# Patient Record
Sex: MALE | Race: OTHER | NOT HISPANIC OR LATINO | Employment: OTHER | URBAN - METROPOLITAN AREA
[De-identification: names, ages, dates, MRNs, and addresses within clinical notes are randomized per-mention and may not be internally consistent; named-entity substitution may affect disease eponyms.]

---

## 2023-11-17 ENCOUNTER — HOSPITAL ENCOUNTER (INPATIENT)
Facility: HOSPITAL | Age: 53
LOS: 2 days | Discharge: HOME | DRG: 228 | End: 2023-11-19
Attending: EMERGENCY MEDICINE | Admitting: SURGERY
Payer: COMMERCIAL

## 2023-11-17 ENCOUNTER — ANESTHESIA (INPATIENT)
Dept: PERIOP | Facility: HOSPITAL | Age: 53
DRG: 228 | End: 2023-11-17
Payer: COMMERCIAL

## 2023-11-17 ENCOUNTER — ANESTHESIA EVENT (INPATIENT)
Dept: PERIOP | Facility: HOSPITAL | Age: 53
DRG: 228 | End: 2023-11-17
Payer: COMMERCIAL

## 2023-11-17 ENCOUNTER — APPOINTMENT (EMERGENCY)
Dept: CT IMAGING | Facility: HOSPITAL | Age: 53
DRG: 228 | End: 2023-11-17
Payer: COMMERCIAL

## 2023-11-17 DIAGNOSIS — R11.2 NAUSEA & VOMITING: ICD-10-CM

## 2023-11-17 DIAGNOSIS — K40.30 INCARCERATED RIGHT INGUINAL HERNIA: ICD-10-CM

## 2023-11-17 DIAGNOSIS — R10.31 RIGHT LOWER QUADRANT ABDOMINAL PAIN: ICD-10-CM

## 2023-11-17 DIAGNOSIS — I10 PRIMARY HYPERTENSION: ICD-10-CM

## 2023-11-17 DIAGNOSIS — I10 HYPERTENSION, UNSPECIFIED TYPE: ICD-10-CM

## 2023-11-17 DIAGNOSIS — K40.90 INGUINAL HERNIA: Primary | ICD-10-CM

## 2023-11-17 PROBLEM — F17.200 SMOKING: Status: ACTIVE | Noted: 2023-11-17

## 2023-11-17 LAB
ALBUMIN SERPL BCP-MCNC: 4.8 G/DL (ref 3.5–5)
ALP SERPL-CCNC: 56 U/L (ref 34–104)
ALT SERPL W P-5'-P-CCNC: 16 U/L (ref 7–52)
ANION GAP SERPL CALCULATED.3IONS-SCNC: 8 MMOL/L
AST SERPL W P-5'-P-CCNC: 25 U/L (ref 13–39)
BASOPHILS # BLD AUTO: 0.05 THOUSANDS/ÂΜL (ref 0–0.1)
BASOPHILS NFR BLD AUTO: 0 % (ref 0–1)
BILIRUB SERPL-MCNC: 0.79 MG/DL (ref 0.2–1)
BUN SERPL-MCNC: 11 MG/DL (ref 5–25)
CALCIUM SERPL-MCNC: 9.7 MG/DL (ref 8.4–10.2)
CHLORIDE SERPL-SCNC: 103 MMOL/L (ref 96–108)
CO2 SERPL-SCNC: 28 MMOL/L (ref 21–32)
CREAT SERPL-MCNC: 1.1 MG/DL (ref 0.6–1.3)
EOSINOPHIL # BLD AUTO: 0.16 THOUSAND/ÂΜL (ref 0–0.61)
EOSINOPHIL NFR BLD AUTO: 1 % (ref 0–6)
ERYTHROCYTE [DISTWIDTH] IN BLOOD BY AUTOMATED COUNT: 12.5 % (ref 11.6–15.1)
GFR SERPL CREATININE-BSD FRML MDRD: 76 ML/MIN/1.73SQ M
GLUCOSE SERPL-MCNC: 86 MG/DL (ref 65–140)
HCT VFR BLD AUTO: 44.8 % (ref 36.5–49.3)
HGB BLD-MCNC: 15.5 G/DL (ref 12–17)
IMM GRANULOCYTES # BLD AUTO: 0.05 THOUSAND/UL (ref 0–0.2)
IMM GRANULOCYTES NFR BLD AUTO: 0 % (ref 0–2)
LACTATE SERPL-SCNC: 1.2 MMOL/L (ref 0.5–2)
LIPASE SERPL-CCNC: 13 U/L (ref 11–82)
LYMPHOCYTES # BLD AUTO: 2.06 THOUSANDS/ÂΜL (ref 0.6–4.47)
LYMPHOCYTES NFR BLD AUTO: 18 % (ref 14–44)
MCH RBC QN AUTO: 31.7 PG (ref 26.8–34.3)
MCHC RBC AUTO-ENTMCNC: 34.6 G/DL (ref 31.4–37.4)
MCV RBC AUTO: 92 FL (ref 82–98)
MONOCYTES # BLD AUTO: 0.64 THOUSAND/ÂΜL (ref 0.17–1.22)
MONOCYTES NFR BLD AUTO: 5 % (ref 4–12)
NEUTROPHILS # BLD AUTO: 8.79 THOUSANDS/ÂΜL (ref 1.85–7.62)
NEUTS SEG NFR BLD AUTO: 76 % (ref 43–75)
NRBC BLD AUTO-RTO: 0 /100 WBCS
PLATELET # BLD AUTO: 290 THOUSANDS/UL (ref 149–390)
PLATELET # BLD AUTO: 317 THOUSANDS/UL (ref 149–390)
PMV BLD AUTO: 9 FL (ref 8.9–12.7)
PMV BLD AUTO: 9.3 FL (ref 8.9–12.7)
POTASSIUM SERPL-SCNC: 3.7 MMOL/L (ref 3.5–5.3)
PROT SERPL-MCNC: 8.1 G/DL (ref 6.4–8.4)
RBC # BLD AUTO: 4.89 MILLION/UL (ref 3.88–5.62)
SODIUM SERPL-SCNC: 139 MMOL/L (ref 135–147)
WBC # BLD AUTO: 11.75 THOUSAND/UL (ref 4.31–10.16)

## 2023-11-17 PROCEDURE — 99284 EMERGENCY DEPT VISIT MOD MDM: CPT

## 2023-11-17 PROCEDURE — 83605 ASSAY OF LACTIC ACID: CPT | Performed by: EMERGENCY MEDICINE

## 2023-11-17 PROCEDURE — 49651 LAP ING HERNIA REPAIR RECUR: CPT | Performed by: PHYSICIAN ASSISTANT

## 2023-11-17 PROCEDURE — 99245 OFF/OP CONSLTJ NEW/EST HI 55: CPT | Performed by: SURGERY

## 2023-11-17 PROCEDURE — 96376 TX/PRO/DX INJ SAME DRUG ADON: CPT

## 2023-11-17 PROCEDURE — 96374 THER/PROPH/DIAG INJ IV PUSH: CPT

## 2023-11-17 PROCEDURE — 85049 AUTOMATED PLATELET COUNT: CPT | Performed by: SURGERY

## 2023-11-17 PROCEDURE — 99285 EMERGENCY DEPT VISIT HI MDM: CPT | Performed by: EMERGENCY MEDICINE

## 2023-11-17 PROCEDURE — 96375 TX/PRO/DX INJ NEW DRUG ADDON: CPT

## 2023-11-17 PROCEDURE — 83690 ASSAY OF LIPASE: CPT | Performed by: EMERGENCY MEDICINE

## 2023-11-17 PROCEDURE — C1781 MESH (IMPLANTABLE): HCPCS | Performed by: SURGERY

## 2023-11-17 PROCEDURE — 96361 HYDRATE IV INFUSION ADD-ON: CPT

## 2023-11-17 PROCEDURE — 74177 CT ABD & PELVIS W/CONTRAST: CPT

## 2023-11-17 PROCEDURE — G1004 CDSM NDSC: HCPCS

## 2023-11-17 PROCEDURE — 36415 COLL VENOUS BLD VENIPUNCTURE: CPT | Performed by: EMERGENCY MEDICINE

## 2023-11-17 PROCEDURE — 0YU54JZ SUPPLEMENT RIGHT INGUINAL REGION WITH SYNTHETIC SUBSTITUTE, PERCUTANEOUS ENDOSCOPIC APPROACH: ICD-10-PCS | Performed by: SURGERY

## 2023-11-17 PROCEDURE — 85025 COMPLETE CBC W/AUTO DIFF WBC: CPT | Performed by: EMERGENCY MEDICINE

## 2023-11-17 PROCEDURE — 80053 COMPREHEN METABOLIC PANEL: CPT | Performed by: EMERGENCY MEDICINE

## 2023-11-17 PROCEDURE — 49651 LAP ING HERNIA REPAIR RECUR: CPT | Performed by: SURGERY

## 2023-11-17 DEVICE — LAPAROSCOPIC SELF-FIXATING MESH POLYESTER WITH POLYLACTIC ACID GRIPS AND COLLAGEN FILM
Type: IMPLANTABLE DEVICE | Site: INGUINAL | Status: FUNCTIONAL
Brand: PROGRIP

## 2023-11-17 RX ORDER — PANTOPRAZOLE SODIUM 40 MG/10ML
40 INJECTION, POWDER, LYOPHILIZED, FOR SOLUTION INTRAVENOUS
Status: DISCONTINUED | OUTPATIENT
Start: 2023-11-18 | End: 2023-11-19 | Stop reason: HOSPADM

## 2023-11-17 RX ORDER — LIDOCAINE HYDROCHLORIDE 20 MG/ML
1 JELLY TOPICAL ONCE
Status: COMPLETED | OUTPATIENT
Start: 2023-11-17 | End: 2023-11-17

## 2023-11-17 RX ORDER — CEFAZOLIN SODIUM 2 G/50ML
2000 SOLUTION INTRAVENOUS
Status: COMPLETED | OUTPATIENT
Start: 2023-11-18 | End: 2023-11-17

## 2023-11-17 RX ORDER — ONDANSETRON 2 MG/ML
4 INJECTION INTRAMUSCULAR; INTRAVENOUS EVERY 6 HOURS PRN
Status: DISCONTINUED | OUTPATIENT
Start: 2023-11-17 | End: 2023-11-19 | Stop reason: HOSPADM

## 2023-11-17 RX ORDER — HYDROMORPHONE HCL/PF 1 MG/ML
0.5 SYRINGE (ML) INJECTION
Status: DISCONTINUED | OUTPATIENT
Start: 2023-11-17 | End: 2023-11-17 | Stop reason: HOSPADM

## 2023-11-17 RX ORDER — KETOROLAC TROMETHAMINE 30 MG/ML
15 INJECTION, SOLUTION INTRAMUSCULAR; INTRAVENOUS ONCE
Status: COMPLETED | OUTPATIENT
Start: 2023-11-17 | End: 2023-11-17

## 2023-11-17 RX ORDER — MAGNESIUM HYDROXIDE 1200 MG/15ML
LIQUID ORAL AS NEEDED
Status: DISCONTINUED | OUTPATIENT
Start: 2023-11-17 | End: 2023-11-17 | Stop reason: HOSPADM

## 2023-11-17 RX ORDER — FENTANYL CITRATE 50 UG/ML
INJECTION, SOLUTION INTRAMUSCULAR; INTRAVENOUS AS NEEDED
Status: DISCONTINUED | OUTPATIENT
Start: 2023-11-17 | End: 2023-11-17

## 2023-11-17 RX ORDER — LIDOCAINE HYDROCHLORIDE 10 MG/ML
INJECTION, SOLUTION EPIDURAL; INFILTRATION; INTRACAUDAL; PERINEURAL AS NEEDED
Status: DISCONTINUED | OUTPATIENT
Start: 2023-11-17 | End: 2023-11-17

## 2023-11-17 RX ORDER — FENTANYL CITRATE/PF 50 MCG/ML
50 SYRINGE (ML) INJECTION
Status: DISCONTINUED | OUTPATIENT
Start: 2023-11-17 | End: 2023-11-17 | Stop reason: HOSPADM

## 2023-11-17 RX ORDER — SUCCINYLCHOLINE/SOD CL,ISO/PF 100 MG/5ML
SYRINGE (ML) INTRAVENOUS AS NEEDED
Status: DISCONTINUED | OUTPATIENT
Start: 2023-11-17 | End: 2023-11-17

## 2023-11-17 RX ORDER — MORPHINE SULFATE 10 MG/ML
6 INJECTION, SOLUTION INTRAMUSCULAR; INTRAVENOUS ONCE
Status: COMPLETED | OUTPATIENT
Start: 2023-11-17 | End: 2023-11-17

## 2023-11-17 RX ORDER — ONDANSETRON 2 MG/ML
4 INJECTION INTRAMUSCULAR; INTRAVENOUS ONCE
Status: COMPLETED | OUTPATIENT
Start: 2023-11-17 | End: 2023-11-17

## 2023-11-17 RX ORDER — SODIUM CHLORIDE, SODIUM LACTATE, POTASSIUM CHLORIDE, CALCIUM CHLORIDE 600; 310; 30; 20 MG/100ML; MG/100ML; MG/100ML; MG/100ML
125 INJECTION, SOLUTION INTRAVENOUS CONTINUOUS
Status: DISCONTINUED | OUTPATIENT
Start: 2023-11-17 | End: 2023-11-19 | Stop reason: HOSPADM

## 2023-11-17 RX ORDER — HYDRALAZINE HYDROCHLORIDE 20 MG/ML
5 INJECTION INTRAMUSCULAR; INTRAVENOUS
Status: COMPLETED | OUTPATIENT
Start: 2023-11-17 | End: 2023-11-17

## 2023-11-17 RX ORDER — PROPOFOL 10 MG/ML
INJECTION, EMULSION INTRAVENOUS AS NEEDED
Status: DISCONTINUED | OUTPATIENT
Start: 2023-11-17 | End: 2023-11-17

## 2023-11-17 RX ORDER — HYDRALAZINE HYDROCHLORIDE 20 MG/ML
5 INJECTION INTRAMUSCULAR; INTRAVENOUS EVERY 6 HOURS PRN
Status: DISCONTINUED | OUTPATIENT
Start: 2023-11-17 | End: 2023-11-19 | Stop reason: HOSPADM

## 2023-11-17 RX ORDER — HEPARIN SODIUM 5000 [USP'U]/ML
5000 INJECTION, SOLUTION INTRAVENOUS; SUBCUTANEOUS
Status: COMPLETED | OUTPATIENT
Start: 2023-11-18 | End: 2023-11-18

## 2023-11-17 RX ORDER — LABETALOL HYDROCHLORIDE 5 MG/ML
INJECTION, SOLUTION INTRAVENOUS AS NEEDED
Status: DISCONTINUED | OUTPATIENT
Start: 2023-11-17 | End: 2023-11-17

## 2023-11-17 RX ORDER — ROCURONIUM BROMIDE 10 MG/ML
INJECTION, SOLUTION INTRAVENOUS AS NEEDED
Status: DISCONTINUED | OUTPATIENT
Start: 2023-11-17 | End: 2023-11-17

## 2023-11-17 RX ORDER — ACETAMINOPHEN 10 MG/ML
1000 INJECTION, SOLUTION INTRAVENOUS EVERY 8 HOURS
Status: DISCONTINUED | OUTPATIENT
Start: 2023-11-17 | End: 2023-11-19 | Stop reason: HOSPADM

## 2023-11-17 RX ORDER — ONDANSETRON 2 MG/ML
4 INJECTION INTRAMUSCULAR; INTRAVENOUS ONCE AS NEEDED
Status: DISCONTINUED | OUTPATIENT
Start: 2023-11-17 | End: 2023-11-17 | Stop reason: HOSPADM

## 2023-11-17 RX ORDER — PHENYLEPHRINE HCL IN 0.9% NACL 1 MG/10 ML
SYRINGE (ML) INTRAVENOUS AS NEEDED
Status: DISCONTINUED | OUTPATIENT
Start: 2023-11-17 | End: 2023-11-17

## 2023-11-17 RX ORDER — MIDAZOLAM HYDROCHLORIDE 2 MG/2ML
INJECTION, SOLUTION INTRAMUSCULAR; INTRAVENOUS AS NEEDED
Status: DISCONTINUED | OUTPATIENT
Start: 2023-11-17 | End: 2023-11-17

## 2023-11-17 RX ADMIN — SODIUM CHLORIDE, SODIUM LACTATE, POTASSIUM CHLORIDE, AND CALCIUM CHLORIDE 125 ML/HR: .6; .31; .03; .02 INJECTION, SOLUTION INTRAVENOUS at 18:56

## 2023-11-17 RX ADMIN — HYDRALAZINE HYDROCHLORIDE 5 MG: 20 INJECTION INTRAMUSCULAR; INTRAVENOUS at 21:15

## 2023-11-17 RX ADMIN — MIDAZOLAM HYDROCHLORIDE 2 MG: 1 INJECTION, SOLUTION INTRAMUSCULAR; INTRAVENOUS at 19:12

## 2023-11-17 RX ADMIN — FENTANYL CITRATE 50 MCG: 50 INJECTION, SOLUTION INTRAMUSCULAR; INTRAVENOUS at 20:29

## 2023-11-17 RX ADMIN — SODIUM CHLORIDE 12 MCG: 9 INJECTION, SOLUTION INTRAVENOUS at 19:32

## 2023-11-17 RX ADMIN — FENTANYL CITRATE 100 MCG: 50 INJECTION, SOLUTION INTRAMUSCULAR; INTRAVENOUS at 19:23

## 2023-11-17 RX ADMIN — ONDANSETRON 4 MG: 2 INJECTION INTRAMUSCULAR; INTRAVENOUS at 13:36

## 2023-11-17 RX ADMIN — ROCURONIUM BROMIDE 50 MG: 10 INJECTION, SOLUTION INTRAVENOUS at 19:30

## 2023-11-17 RX ADMIN — SUGAMMADEX 200 MG: 100 INJECTION, SOLUTION INTRAVENOUS at 20:21

## 2023-11-17 RX ADMIN — SODIUM CHLORIDE 1000 ML: 0.9 INJECTION, SOLUTION INTRAVENOUS at 13:36

## 2023-11-17 RX ADMIN — PROPOFOL 200 MG: 10 INJECTION, EMULSION INTRAVENOUS at 19:23

## 2023-11-17 RX ADMIN — Medication 100 MCG: at 19:34

## 2023-11-17 RX ADMIN — MORPHINE SULFATE 6 MG: 10 INJECTION INTRAVENOUS at 16:59

## 2023-11-17 RX ADMIN — LABETALOL HYDROCHLORIDE 20 MG: 5 INJECTION, SOLUTION INTRAVENOUS at 19:29

## 2023-11-17 RX ADMIN — KETOROLAC TROMETHAMINE 15 MG: 30 INJECTION, SOLUTION INTRAMUSCULAR at 13:36

## 2023-11-17 RX ADMIN — Medication 100 MCG: at 19:30

## 2023-11-17 RX ADMIN — LIDOCAINE HYDROCHLORIDE 1 APPLICATION: 20 JELLY TOPICAL at 18:42

## 2023-11-17 RX ADMIN — CEFAZOLIN SODIUM 2000 MG: 2 SOLUTION INTRAVENOUS at 19:21

## 2023-11-17 RX ADMIN — Medication 180 MG: at 19:23

## 2023-11-17 RX ADMIN — FENTANYL CITRATE 50 MCG: 50 INJECTION, SOLUTION INTRAMUSCULAR; INTRAVENOUS at 20:19

## 2023-11-17 RX ADMIN — IOHEXOL 100 ML: 350 INJECTION, SOLUTION INTRAVENOUS at 14:52

## 2023-11-17 RX ADMIN — SODIUM CHLORIDE 12 MCG: 9 INJECTION, SOLUTION INTRAVENOUS at 20:02

## 2023-11-17 RX ADMIN — MORPHINE SULFATE 6 MG: 10 INJECTION INTRAVENOUS at 13:36

## 2023-11-17 RX ADMIN — ONDANSETRON 4 MG: 2 INJECTION INTRAMUSCULAR; INTRAVENOUS at 16:59

## 2023-11-17 RX ADMIN — Medication 100 MCG: at 19:39

## 2023-11-17 RX ADMIN — FENTANYL CITRATE 100 MCG: 50 INJECTION, SOLUTION INTRAMUSCULAR; INTRAVENOUS at 19:30

## 2023-11-17 RX ADMIN — ONDANSETRON 4 MG: 2 INJECTION INTRAMUSCULAR; INTRAVENOUS at 20:19

## 2023-11-17 RX ADMIN — LIDOCAINE HYDROCHLORIDE 50 MG: 10 INJECTION, SOLUTION EPIDURAL; INFILTRATION; INTRACAUDAL; PERINEURAL at 19:23

## 2023-11-17 RX ADMIN — ACETAMINOPHEN 1000 MG: 10 INJECTION INTRAVENOUS at 22:26

## 2023-11-17 RX ADMIN — HYDRALAZINE HYDROCHLORIDE 5 MG: 20 INJECTION INTRAMUSCULAR; INTRAVENOUS at 20:59

## 2023-11-17 NOTE — LETTER
[address] [facility] FLOOR MED SURG UNIT  [address]  [address] [facility] [address]  Dept: [phone]    November 19, 2023     Patient: Terell Montejo   YOB: 1970   Date of Visit: 11/17/2023       To Whom it May Concern:    Terell Montejo is under my professional care. He was seen in the hospital from 11/17/2023 to 11/19/23. He may return to work on 11/27/2023 with the following limitations   : No lifting, pushing, or pulling more than 15lbs. If this cannot be accommodated, please excuse him from work until cleared by the physician      If you have any questions or concerns, please don't hesitate to call.          Sincerely,          Moriah Edgar PA-C

## 2023-11-17 NOTE — ED PROVIDER NOTES
History  Chief Complaint   Patient presents with    Hernia     Pt presents in wheelchair c/o "right lower hernia that is causing me a lot of pain."     51-year-old male history of inguinal hernia status post previous repair presenting with right groin pain. Patient reports intermittent recurrence of hernia for the past few months occasionally associated with pain and nausea vomiting. Patient reports increasing frequency and severity of hernia. Patient reports the hernia popped back out about 8 or 9 hours ago and has been having increasing pain and swelling in the area since that time. Associated with nausea and a few episodes of nonbloody nonbilious emesis. Patient reports that he is still passing gas. Denies any chest pain shortness of breath. Denies any other complaints. Chart reviewed. History reviewed. No pertinent past medical history. Family History: non-contributory  Social History          None       History reviewed. No pertinent past medical history. History reviewed. No pertinent surgical history. History reviewed. No pertinent family history. I have reviewed and agree with the history as documented. E-Cigarette/Vaping     E-Cigarette/Vaping Substances     Social History     Tobacco Use    Smoking status: Every Day     Packs/day: 0.50     Types: Cigarettes    Smokeless tobacco: Never   Substance Use Topics    Alcohol use: Never    Drug use: Never       Review of Systems   Constitutional:  Negative for appetite change, chills, diaphoresis, fever and unexpected weight change. HENT:  Negative for congestion and rhinorrhea. Eyes:  Negative for photophobia and visual disturbance. Respiratory:  Negative for cough, chest tightness and shortness of breath. Cardiovascular:  Negative for chest pain, palpitations and leg swelling. Gastrointestinal:  Positive for abdominal pain, nausea and vomiting. Negative for abdominal distention, blood in stool, constipation and diarrhea. Genitourinary:  Negative for dysuria and hematuria. Musculoskeletal:  Negative for back pain, joint swelling, neck pain and neck stiffness. Skin:  Negative for color change, pallor, rash and wound. Neurological:  Negative for dizziness, syncope, weakness, light-headedness and headaches. Psychiatric/Behavioral:  Negative for agitation. All other systems reviewed and are negative. Physical Exam  Physical Exam  Vitals and nursing note reviewed. Constitutional:       General: He is not in acute distress. Appearance: He is well-developed. He is ill-appearing. He is not toxic-appearing or diaphoretic. HENT:      Head: Normocephalic and atraumatic. Nose: Nose normal. No congestion or rhinorrhea. Mouth/Throat:      Mouth: Mucous membranes are moist.      Pharynx: Oropharynx is clear. No oropharyngeal exudate or posterior oropharyngeal erythema. Eyes:      General: No scleral icterus. Right eye: No discharge. Left eye: No discharge. Extraocular Movements: Extraocular movements intact. Conjunctiva/sclera: Conjunctivae normal.      Pupils: Pupils are equal, round, and reactive to light. Neck:      Vascular: No JVD. Trachea: No tracheal deviation. Comments: Supple. Normal range of motion. Cardiovascular:      Rate and Rhythm: Normal rate and regular rhythm. Heart sounds: Normal heart sounds. No murmur heard. No friction rub. No gallop. Comments: Normal rate and regular rhythm  Pulmonary:      Effort: Pulmonary effort is normal. No respiratory distress. Breath sounds: Normal breath sounds. No stridor. No wheezing or rales. Comments: Clear to auscultation bilaterally  Chest:      Chest wall: No tenderness. Abdominal:      General: Bowel sounds are normal. There is no distension. Palpations: Abdomen is soft. Tenderness: There is abdominal tenderness. There is guarding.  There is no right CVA tenderness, left CVA tenderness or rebound. Comments: Uncomfortable appearing. Area of swelling and overlying tenderness right groin with overlying guarding. No skin changes. Normal bowel sounds throughout   Musculoskeletal:         General: No swelling, tenderness, deformity or signs of injury. Normal range of motion. Cervical back: Normal range of motion and neck supple. No rigidity. No muscular tenderness. Right lower leg: No edema. Left lower leg: No edema. Lymphadenopathy:      Cervical: No cervical adenopathy. Skin:     General: Skin is warm and dry. Coloration: Skin is not pale. Findings: No erythema or rash. Neurological:      General: No focal deficit present. Mental Status: He is alert. Mental status is at baseline. Sensory: No sensory deficit. Motor: No weakness or abnormal muscle tone. Coordination: Coordination normal.      Gait: Gait normal.      Comments: Alert. Strength and sensation grossly intact. Ambulatory without difficulty at baseline. Psychiatric:         Behavior: Behavior normal.         Thought Content:  Thought content normal.         Vital Signs  ED Triage Vitals   Temperature Pulse Respirations Blood Pressure SpO2   11/17/23 1316 11/17/23 1316 11/17/23 1316 11/17/23 1316 11/17/23 1316   98.4 °F (36.9 °C) 75 21 (!) 237/113 100 %      Temp Source Heart Rate Source Patient Position - Orthostatic VS BP Location FiO2 (%)   11/17/23 1316 11/17/23 1316 11/17/23 1316 11/17/23 1316 --   Oral Monitor Sitting Left arm       Pain Score       11/17/23 1336       10 - Worst Possible Pain           Vitals:    11/17/23 1500 11/17/23 1530 11/17/23 1630 11/17/23 1730   BP: (!) 187/88 (!) 194/96 (!) 188/106 (!) 183/97   Pulse: 81 68 64 65   Patient Position - Orthostatic VS: Lying            Visual Acuity  Visual Acuity      Flowsheet Row Most Recent Value   L Pupil Size (mm) 3   R Pupil Size (mm) 3            ED Medications  Medications   lactated ringers infusion ( Intravenous Restarted 11/17/23 1920)   ondansetron (ZOFRAN) injection 4 mg (4 mg Intravenous Given 11/17/23 2019)   nicotine (NICODERM CQ) 7 mg/24hr TD 24 hr patch 1 patch ( Transdermal Dose Auto Held 11/21/23 0900)   heparin (porcine) subcutaneous injection 5,000 Units ( Subcutaneous Dose Auto Held 11/18/23 0600)   morphine injection 2 mg ( Intravenous MAR Hold 11/17/23 1901)   pantoprazole (PROTONIX) injection 40 mg ( Intravenous Dose Auto Held 11/21/23 0900)   fentaNYL (SUBLIMAZE) injection 50 mcg (has no administration in time range)   HYDROmorphone (DILAUDID) injection 0.5 mg (has no administration in time range)   ondansetron (ZOFRAN) injection 4 mg (has no administration in time range)   sterile water irrigation solution (1,000 mL Irrigation Given 11/17/23 1947)   sodium chloride 0.9 % irrigation solution (1,000 mL Irrigation Given 11/17/23 1932)   morphine injection 6 mg (6 mg Intravenous Given 11/17/23 1336)   ketorolac (TORADOL) injection 15 mg (15 mg Intravenous Given 11/17/23 1336)   sodium chloride 0.9 % bolus 1,000 mL (0 mL Intravenous Stopped 11/17/23 1436)   ondansetron (ZOFRAN) injection 4 mg (4 mg Intravenous Given 11/17/23 1336)   iohexol (OMNIPAQUE) 350 MG/ML injection (MULTI-DOSE) 100 mL (100 mL Intravenous Given 11/17/23 1452)   morphine injection 6 mg (6 mg Intravenous Given 11/17/23 1659)   ondansetron (ZOFRAN) injection 4 mg (4 mg Intravenous Given 11/17/23 1659)   ceFAZolin (ANCEF) IVPB (premix in dextrose) 2,000 mg 50 mL ( Intravenous MAR Unhold 11/17/23 1921)   lidocaine (URO-JET) 2 % urethral/mucosal gel 1 Application (1 Application Topical Given by Other 11/17/23 1842)       Diagnostic Studies  Results Reviewed       Procedure Component Value Units Date/Time    Platelet count [[id number]]  (Normal) Collected: 11/17/23 1854    Lab Status: Final result Specimen: Blood from Arm, Right Updated: 11/17/23 1901     Platelets 620 Thousands/uL      MPV 9.0 fL     Comprehensive metabolic panel [[id number]] Collected: 11/17/23 1334    Lab Status: Final result Specimen: Blood from Arm, Right Updated: 11/17/23 1405     Sodium 139 mmol/L      Potassium 3.7 mmol/L      Chloride 103 mmol/L      CO2 28 mmol/L      ANION GAP 8 mmol/L      BUN 11 mg/dL      Creatinine 1.10 mg/dL      Glucose 86 mg/dL      Calcium 9.7 mg/dL      AST 25 U/L      ALT 16 U/L      Alkaline Phosphatase 56 U/L      Total Protein 8.1 g/dL      Albumin 4.8 g/dL      Total Bilirubin 0.79 mg/dL      eGFR 76 ml/min/1.73sq m     Narrative:      [employer] guidelines for Chronic Kidney Disease (CKD):     Stage 1 with normal or high GFR (GFR > 90 mL/min/1.73 square meters)    Stage 2 Mild CKD (GFR = 60-89 mL/min/1.73 square meters)    Stage 3A Moderate CKD (GFR = 45-59 mL/min/1.73 square meters)    Stage 3B Moderate CKD (GFR = 30-44 mL/min/1.73 square meters)    Stage 4 Severe CKD (GFR = 15-29 mL/min/1.73 square meters)    Stage 5 End Stage CKD (GFR <15 mL/min/1.73 square meters)  Note: GFR calculation is accurate only with a steady state creatinine    Lipase [[id number]]  (Normal) Collected: 11/17/23 1334    Lab Status: Final result Specimen: Blood from Arm, Right Updated: 11/17/23 1405     Lipase 13 u/L     Lactic acid, plasma (w/reflex if result > 2.0) [[id number]]  (Normal) Collected: 11/17/23 1334    Lab Status: Final result Specimen: Blood from Arm, Right Updated: 11/17/23 1404     LACTIC ACID 1.2 mmol/L     Narrative:      Result may be elevated if tourniquet was used during collection.     CBC and differential [[id number]]  (Abnormal) Collected: 11/17/23 1334    Lab Status: Final result Specimen: Blood from Arm, Right Updated: 11/17/23 1349     WBC 11.75 Thousand/uL      RBC 4.89 Million/uL      Hemoglobin 15.5 g/dL      Hematocrit 44.8 %      MCV 92 fL      MCH 31.7 pg      MCHC 34.6 g/dL      RDW 12.5 %      MPV 9.3 fL      Platelets 673 Thousands/uL      nRBC 0 /100 WBCs      Neutrophils Relative 76 %      Immat GRANS % 0 %      Lymphocytes Relative 18 %      Monocytes Relative 5 %      Eosinophils Relative 1 %      Basophils Relative 0 %      Neutrophils Absolute 8.79 Thousands/µL      Immature Grans Absolute 0.05 Thousand/uL      Lymphocytes Absolute 2.06 Thousands/µL      Monocytes Absolute 0.64 Thousand/µL      Eosinophils Absolute 0.16 Thousand/µL      Basophils Absolute 0.05 Thousands/µL                    CT abdomen pelvis with contrast   Final Result by Robbie Ruth MD (11/17 1550)      1. Large right inguinal hernia containing part of the distal ileum and ascending colon with a moderate amount of fluid within the hernia sac. Associated mild prominence of the adjacent proximal small bowel loops which may reflect early bowel obstruction. Recommend surgical consult. 2. Couple of left lower lobe solid pulmonary nodules measuring 3 mm. Based on current Fleischner Society 2017 Guidelines on incidental pulmonary nodule, no routine follow-up is needed if the patient is considered low risk for lung cancer. If the patient    is considered high risk for lung cancer, 12 month follow-up non-contrast chest CT is recommended. I personally discussed this study with Missy Flanagan on 11/17/2023 3:50 PM.            Resident: Juno Kwok, the attending radiologist, have reviewed the images and agree with the final report above. Workstation performed: [id number]                    Procedures  Procedures         ED Course                               SBIRT 22yo+      Flowsheet Row Most Recent Value   Initial Alcohol Screen: US AUDIT-C     1. How often do you have a drink containing alcohol? 0 Filed at: 11/17/2023 1317   2. How many drinks containing alcohol do you have on a typical day you are drinking? 0 Filed at: 11/17/2023 1317   3a. Male UNDER 65: How often do you have five or more drinks on one occasion?  0 Filed at: 11/17/2023 1317   Audit-C Score 0 Filed at: 11/17/2023 1317   BUZZ: How many times in the past year have you. .. Used an illegal drug or used a prescription medication for non-medical reasons? Never Filed at: 11/17/2023 1317                      Medical Decision Making  49-year-old male history of inguinal hernia status post previous repair presenting with right groin pain. Known inguinal hernia with recurrence and worsening with nausea vomiting and guarding without skin changes. Concern for entrapped hernia. Plan for labs including abdominal labs plus lactic acid. CT imaging. Symptom management with IV pain and nausea medication plus fluids. Patient put in recumbent position with knees up and ice placed over hernia. Reassess. Labs without significant acute process. Symptoms moderately improved after medications. CT imaging with large volume of intestines located in hernia with mild proximal dilation concerning for early small bowel obstruction. Attempted to reduce hernia with minimal return of contents. Discussed case with general surgery. Plan to admit for further evaluation and management inpatient. Admitted. Amount and/or Complexity of Data Reviewed  Labs: ordered. Radiology: ordered. Risk  Prescription drug management. Decision regarding hospitalization.              Disposition  Final diagnoses:   Right lower quadrant abdominal pain   Nausea & vomiting   Inguinal hernia     Time reflects when diagnosis was documented in both MDM as applicable and the Disposition within this note       Time User Action Codes Description Comment    11/17/2023  1:38 PM Valri Little Add [R10.31] Right lower quadrant abdominal pain     11/17/2023  1:38 PM Valri Little Add [R11.2] Nausea & vomiting     11/17/2023  6:24 PM Mark, [address] Primary hypertension     11/17/2023  6:26 PM Shaka Fry Add [K40.30] Incarcerated right inguinal hernia     11/17/2023  6:42 PM Valri Little Add [K40.90] Inguinal hernia     11/17/2023  6:42 PM Valri Little Modify [R10.31] Right lower quadrant abdominal pain     11/17/2023  6:42 PM Adrian Corcoran Modify [K40.90] Inguinal hernia           ED Disposition       ED Disposition   Admit    Condition   Stable    Date/Time   Fri Nov 17, 2023 1842    Comment   Case was discussed with general surgery and the patient's admission status was agreed to be Admission Status: inpatient status to the service of Dr. Ayala . Follow-up Information    None         There are no discharge medications for this patient. No discharge procedures on file.     PDMP Review       None            ED Provider  Electronically Signed by             Duncan Garrett MD  11/17/23 2027

## 2023-11-17 NOTE — Clinical Note
Case was discussed with general surgery and the patient's admission status was agreed to be Admission Status: observation status to the service of Dr. Ana Maria Salcedo .

## 2023-11-17 NOTE — H&P
Consultation - General Surgery   Darien Us 48 y.o. male MRN: [id number]  Unit/Bed#: ED 10 Encounter: [id number]    Assessment/Plan     Assessment:  Incarcerated right inguinal hernia  Smoker  Known diagnosis hypertension  Plan:  In light of the symptoms of a small bowel obstruction and size of the hernia of I advised patient to undergo laparoscopic right inguinal hernia repair with mesh. I discussed the operative procedure, risk, benefits and alternatives, but not limited to bleeding, infection after surgery, recurrence, injury to adjacent organs, need for furher operations, loss of time from work, the patient understood and agreed to proceed with the above surgery. History of Present Illness     HPI:  Darien Us is a 48 y.o. male who presents to the emergency room complaining of 8-hour history of increasing abdominal pain and right groin pain, associated with nausea and vomiting and at least one occasion. The patient is known to have right inguinal hernia for many years, he usually gets sick after eating, associated with nausea and vomiting several times in a week. Patient stated that this time was different and was getting worse, he denies having any fever, chills, diarrhea, constipation or urinary symptoms. Patient had right inguinal hernia repair many years ago at [address] [place] [address] [employer] [facility] [address]  The rest of the review of system total of 10 were negative except for the HPI. Historical Information   History reviewed. No pertinent past medical history. History reviewed. No pertinent surgical history.   Social History   Social History     Substance and Sexual Activity   Alcohol Use Never     Social History     Substance and Sexual Activity   Drug Use Never     E-Cigarette/Vaping     E-Cigarette/Vaping Substances     Social History     Tobacco Use   Smoking Status Every Day    Packs/day: 0.50    Types: Cigarettes   Smokeless Tobacco Never     Family History: non-contributory    Meds/Allergies   all current active meds have been reviewed, current meds:   Current Facility-Administered Medications   Medication Dose Route Frequency    [START ON 11/18/2023] ceFAZolin (ANCEF) IVPB (premix in dextrose) 2,000 mg 50 mL  2,000 mg Intravenous On Call To OR    [START ON 11/18/2023] heparin (porcine) subcutaneous injection 5,000 Units  5,000 Units Subcutaneous On Call To OR    lactated ringers infusion  125 mL/hr Intravenous Continuous    morphine injection 2 mg  2 mg Intravenous Q2H PRN    [START ON 11/18/2023] nicotine (NICODERM CQ) 7 mg/24hr TD 24 hr patch 1 patch  1 patch Transdermal Daily    ondansetron (ZOFRAN) injection 4 mg  4 mg Intravenous Q6H PRN    [START ON 11/18/2023] pantoprazole (PROTONIX) injection 40 mg  40 mg Intravenous Q24H [address]   , and PTA meds:   None     Allergies   Allergen Reactions    Penicillins Other (See Comments)     Pt states I've been allergic since childhood       Objective   First Vitals:   Blood Pressure: (!) 237/113 (11/17/23 1316)  Pulse: 75 (11/17/23 1316)  Temperature: 98.4 °F (36.9 °C) (11/17/23 1316)  Temp Source: Oral (11/17/23 1316)  Respirations: 21 (11/17/23 1316)  Height: 6' 2" (188 cm) (11/17/23 1316)  Weight - Scale: 90.7 kg (200 lb) (11/17/23 1316)  SpO2: 100 % (11/17/23 1316)    Current Vitals:   Blood Pressure: (!) 183/97 (11/17/23 1730)  Pulse: 65 (11/17/23 1730)  Temperature: 98.4 °F (36.9 °C) (11/17/23 1316)  Temp Source: Oral (11/17/23 1316)  Respirations: 22 (11/17/23 1730)  Height: 6' 2" (188 cm) (11/17/23 1316)  Weight - Scale: 90.7 kg (200 lb) (11/17/23 1316)  SpO2: 97 % (11/17/23 1730)    No intake or output data in the 24 hours ending 11/17/23 1827    Invasive Devices       Peripheral Intravenous Line  Duration             Peripheral IV 11/17/23 Right Antecubital <1 day                    Physical Exam  Vitals and nursing note reviewed. Constitutional:       General: He is not in acute distress.   Cardiovascular: Rate and Rhythm: Normal rate and regular rhythm. Pulmonary:      Effort: No respiratory distress. Breath sounds: Normal breath sounds. Abdominal:      Comments: Abdomen is soft, nondistended and nontender, there is a lump on the right groin consistent with incarcerated right inguinal hernia, tender to palpation, unable to reduce it. No skin color changes. There is an incision over the right groin from previous open inguinal hernia repair. Skin:     General: Skin is warm. Coloration: Skin is not jaundiced. Findings: No erythema or rash. Neurological:      Mental Status: He is alert and oriented to person, place, and time. Cranial Nerves: No cranial nerve deficit. Psychiatric:         Mood and Affect: Mood normal.         Behavior: Behavior normal.       Lab Results: I have personally reviewed pertinent lab results. , CBC:   Lab Results   Component Value Date    WBC 11.75 (H) 11/17/2023    HGB 15.5 11/17/2023    HCT 44.8 11/17/2023    MCV 92 11/17/2023     11/17/2023    RBC 4.89 11/17/2023    MCH 31.7 11/17/2023    MCHC 34.6 11/17/2023    RDW 12.5 11/17/2023    MPV 9.3 11/17/2023    NRBC 0 11/17/2023   , CMP:   Lab Results   Component Value Date    SODIUM 139 11/17/2023    K 3.7 11/17/2023     11/17/2023    CO2 28 11/17/2023    BUN 11 11/17/2023    CREATININE 1.10 11/17/2023    CALCIUM 9.7 11/17/2023    AST 25 11/17/2023    ALT 16 11/17/2023    ALKPHOS 56 11/17/2023    EGFR 76 11/17/2023   , Coagulation: No results found for: "PT", "INR", "APTT", Urinalysis: No results found for: "COLORU", "CLARITYU", "Lesa Sleigh", "PHUR", "LEUKOCYTESUR", "NITRITE", "PROTEINUA", "GLUCOSEU", "Morgan Medici", "Renell Fly", "BLOODU", Amylase: No results found for: "AMYLASE", Lipase:   Lab Results   Component Value Date    LIPASE 13 11/17/2023     Imaging: I have personally reviewed pertinent reports.    and I have personally reviewed pertinent films in PACS    CT abdomen pelvis with contrast [[id number]] Collected: 11/17/23 1506   Order Status: Completed Updated: 11/17/23 1551   Narrative:     CT ABDOMEN AND PELVIS WITH IV CONTRAST    INDICATION:   RLQ abdominal pain (Age >= 14y)  Right lower quadrant abdominal pain with nausea vomiting, hernia, concern for possible incarceration or strangulation. COMPARISON: Outside report from CT abdomen pelvis 09/26/2014    TECHNIQUE:  CT examination of the abdomen and pelvis was performed. Multiplanar 2D reformatted images were created from the source data. This examination, like all CT scans performed in the [facility], was performed utilizing techniques to minimize radiation dose exposure, including the use of iterative reconstruction and automated exposure control. Radiation dose length  product (DLP) for this visit:  632 mGy-cm    IV Contrast:  100 mL of iohexol (OMNIPAQUE)  Enteric Contrast: Enteric contrast was not administered. FINDINGS:    ABDOMEN    LOWER CHEST: Couple of left lower lobe solid nodules measuring 3 mm (series 2 image 18). LIVER/BILIARY TREE:  Unremarkable. GALLBLADDER:  No calcified gallstones. No pericholecystic inflammatory change. SPLEEN:  Unremarkable. PANCREAS:  Unremarkable. ADRENAL GLANDS:  Unremarkable. KIDNEYS/URETERS:  No hydronephrosis or urinary tract calculus. Few subcentimeter renal hypodensities, too small to accurately characterize, and statistically most likely benign findings. According to recent literature (Radiology 2019) no further workup  of these findings is recommended. STOMACH AND BOWEL: Part of the distal ileum and ascending colon within a right inguinal hernia as detailed below. No evidence of bowel ischemia. APPENDIX: Appendix not visualized and may be within the right inguinal hernia. ABDOMINOPELVIC CAVITY: No ascites. No pneumoperitoneum. No lymphadenopathy. VESSELS:  Unremarkable for patient's age.     PELVIS    REPRODUCTIVE ORGANS:  The prostate is enlarged. URINARY BLADDER:  Unremarkable. ABDOMINAL WALL/INGUINAL REGIONS: Large right inguinal hernia containing part of the distal ileum and ascending colon with a moderate amount of fluid within the hernia sac. Associated distention of the adjacent proximal small bowel loops within the pelvis   measuring up to 3 cm in diameter which may reflect developing bowel obstruction. OSSEOUS STRUCTURES:  No acute fracture or destructive osseous lesion. Spinal degenerative changes. Few small sclerotic lesions within the sacrum and pelvic bones, statistically most likely bone islands. Impression:       1. Large right inguinal hernia containing part of the distal ileum and ascending colon with a moderate amount of fluid within the hernia sac. Associated mild prominence of the adjacent proximal small bowel loops which may reflect early bowel obstruction. Recommend surgical consult. 2. Couple of left lower lobe solid pulmonary nodules measuring 3 mm. Based on current Fleischner Society 2017 Guidelines on incidental pulmonary nodule, no routine follow-up is needed if the patient is considered low risk for lung cancer. If the patient   is considered high risk for lung cancer, 12 month follow-up non-contrast chest CT is recommended.

## 2023-11-17 NOTE — ANESTHESIA PREPROCEDURE EVALUATION
Procedure:  REPAIR HERNIA INGUINAL, LAPAROSCOPIC with mesh (Right: Groin)    Relevant Problems   ANESTHESIA (within normal limits)      CARDIO (within normal limits)  No formal diagnosis of HTN, but hasn't had BP checked in years      ENDO (within normal limits)      GI/HEPATIC  +Vomiting  Confirmed NPo appropriate  Drinks "a couple six packs" per week      /RENAL (within normal limits)      HEMATOLOGY (within normal limits)      MUSCULOSKELETAL (within normal limits)      NEURO/PSYCH (within normal limits)      PULMONARY   (+) Smoking        Physical Exam    Airway    Mallampati score: II  TM Distance: >3 FB  Neck ROM: full     Dental   No notable dental hx     Cardiovascular  Rhythm: regular, Cardiovascular exam normal    Pulmonary  Pulmonary exam normal     Other Findings        Anesthesia Plan  ASA Score- 2 Emergent    Anesthesia Type- general with ASA Monitors. Additional Monitors:     Airway Plan: ETT. Plan Factors-Exercise tolerance (METS): >4 METS. Chart reviewed. Existing labs reviewed. Patient is a current smoker. Patient not instructed to abstain from smoking on day of procedure. Induction- intravenous and rapid sequence induction. Postoperative Plan- Plan for postoperative opioid use. Planned trial extubation    Informed Consent- Anesthetic plan and risks discussed with patient. I personally reviewed this patient with the CRNA. Discussed and agreed on the Anesthesia Plan with the CRNA. .            Lab Results   Component Value Date    WBC 11.75 (H) 11/17/2023    HGB 15.5 11/17/2023    HCT 44.8 11/17/2023    MCV 92 11/17/2023     11/17/2023     Lab Results   Component Value Date    SODIUM 139 11/17/2023    K 3.7 11/17/2023     11/17/2023    CO2 28 11/17/2023    BUN 11 11/17/2023    CREATININE 1.10 11/17/2023    GLUC 86 11/17/2023    CALCIUM 9.7 11/17/2023     Lab Results   Component Value Date    ALT 16 11/17/2023    AST 25 11/17/2023    ALKPHOS 56 11/17/2023     No results found for: "INR", "PROTIME"  No results found for: "HGBA1C"

## 2023-11-18 PROBLEM — I10 HTN (HYPERTENSION): Status: ACTIVE | Noted: 2023-11-18

## 2023-11-18 LAB
ANION GAP SERPL CALCULATED.3IONS-SCNC: 6 MMOL/L
BASOPHILS # BLD AUTO: 0.03 THOUSANDS/ÂΜL (ref 0–0.1)
BASOPHILS NFR BLD AUTO: 0 % (ref 0–1)
BUN SERPL-MCNC: 10 MG/DL (ref 5–25)
CALCIUM SERPL-MCNC: 9.1 MG/DL (ref 8.4–10.2)
CHLORIDE SERPL-SCNC: 104 MMOL/L (ref 96–108)
CO2 SERPL-SCNC: 30 MMOL/L (ref 21–32)
CREAT SERPL-MCNC: 1.02 MG/DL (ref 0.6–1.3)
EOSINOPHIL # BLD AUTO: 0.09 THOUSAND/ÂΜL (ref 0–0.61)
EOSINOPHIL NFR BLD AUTO: 1 % (ref 0–6)
ERYTHROCYTE [DISTWIDTH] IN BLOOD BY AUTOMATED COUNT: 12.5 % (ref 11.6–15.1)
GFR SERPL CREATININE-BSD FRML MDRD: 83 ML/MIN/1.73SQ M
GLUCOSE SERPL-MCNC: 91 MG/DL (ref 65–140)
HCT VFR BLD AUTO: 41.3 % (ref 36.5–49.3)
HGB BLD-MCNC: 13.9 G/DL (ref 12–17)
IMM GRANULOCYTES # BLD AUTO: 0.03 THOUSAND/UL (ref 0–0.2)
IMM GRANULOCYTES NFR BLD AUTO: 0 % (ref 0–2)
LYMPHOCYTES # BLD AUTO: 1.52 THOUSANDS/ÂΜL (ref 0.6–4.47)
LYMPHOCYTES NFR BLD AUTO: 16 % (ref 14–44)
MCH RBC QN AUTO: 31 PG (ref 26.8–34.3)
MCHC RBC AUTO-ENTMCNC: 33.7 G/DL (ref 31.4–37.4)
MCV RBC AUTO: 92 FL (ref 82–98)
MONOCYTES # BLD AUTO: 0.67 THOUSAND/ÂΜL (ref 0.17–1.22)
MONOCYTES NFR BLD AUTO: 7 % (ref 4–12)
NEUTROPHILS # BLD AUTO: 7.49 THOUSANDS/ÂΜL (ref 1.85–7.62)
NEUTS SEG NFR BLD AUTO: 76 % (ref 43–75)
NRBC BLD AUTO-RTO: 0 /100 WBCS
PLATELET # BLD AUTO: 269 THOUSANDS/UL (ref 149–390)
PMV BLD AUTO: 9.6 FL (ref 8.9–12.7)
POTASSIUM SERPL-SCNC: 3.6 MMOL/L (ref 3.5–5.3)
RBC # BLD AUTO: 4.48 MILLION/UL (ref 3.88–5.62)
SODIUM SERPL-SCNC: 140 MMOL/L (ref 135–147)
WBC # BLD AUTO: 9.83 THOUSAND/UL (ref 4.31–10.16)

## 2023-11-18 PROCEDURE — 99255 IP/OBS CONSLTJ NEW/EST HI 80: CPT | Performed by: FAMILY MEDICINE

## 2023-11-18 PROCEDURE — 85025 COMPLETE CBC W/AUTO DIFF WBC: CPT | Performed by: PHYSICIAN ASSISTANT

## 2023-11-18 PROCEDURE — 99024 POSTOP FOLLOW-UP VISIT: CPT | Performed by: PHYSICIAN ASSISTANT

## 2023-11-18 PROCEDURE — 80048 BASIC METABOLIC PNL TOTAL CA: CPT | Performed by: PHYSICIAN ASSISTANT

## 2023-11-18 PROCEDURE — C9113 INJ PANTOPRAZOLE SODIUM, VIA: HCPCS | Performed by: PHYSICIAN ASSISTANT

## 2023-11-18 RX ORDER — LANOLIN ALCOHOL/MO/W.PET/CERES
6 CREAM (GRAM) TOPICAL
Status: DISCONTINUED | OUTPATIENT
Start: 2023-11-18 | End: 2023-11-19 | Stop reason: HOSPADM

## 2023-11-18 RX ORDER — AMLODIPINE BESYLATE 5 MG/1
5 TABLET ORAL DAILY
Status: DISCONTINUED | OUTPATIENT
Start: 2023-11-18 | End: 2023-11-19 | Stop reason: HOSPADM

## 2023-11-18 RX ADMIN — PANTOPRAZOLE SODIUM 40 MG: 40 INJECTION, POWDER, FOR SOLUTION INTRAVENOUS at 08:18

## 2023-11-18 RX ADMIN — ACETAMINOPHEN 1000 MG: 10 INJECTION INTRAVENOUS at 22:28

## 2023-11-18 RX ADMIN — MORPHINE SULFATE 2 MG: 2 INJECTION, SOLUTION INTRAMUSCULAR; INTRAVENOUS at 20:23

## 2023-11-18 RX ADMIN — NICOTINE 1 PATCH: 7 PATCH, EXTENDED RELEASE TRANSDERMAL at 08:18

## 2023-11-18 RX ADMIN — MORPHINE SULFATE 2 MG: 2 INJECTION, SOLUTION INTRAMUSCULAR; INTRAVENOUS at 14:46

## 2023-11-18 RX ADMIN — HEPARIN SODIUM 5000 UNITS: 5000 INJECTION INTRAVENOUS; SUBCUTANEOUS at 05:54

## 2023-11-18 RX ADMIN — HYDRALAZINE HYDROCHLORIDE 5 MG: 20 INJECTION INTRAMUSCULAR; INTRAVENOUS at 20:23

## 2023-11-18 RX ADMIN — AMLODIPINE BESYLATE 5 MG: 5 TABLET ORAL at 13:03

## 2023-11-18 RX ADMIN — SODIUM CHLORIDE, SODIUM LACTATE, POTASSIUM CHLORIDE, AND CALCIUM CHLORIDE 125 ML/HR: .6; .31; .03; .02 INJECTION, SOLUTION INTRAVENOUS at 23:17

## 2023-11-18 RX ADMIN — ACETAMINOPHEN 1000 MG: 10 INJECTION INTRAVENOUS at 05:53

## 2023-11-18 RX ADMIN — ACETAMINOPHEN 1000 MG: 10 INJECTION INTRAVENOUS at 13:16

## 2023-11-18 RX ADMIN — Medication 6 MG: at 22:27

## 2023-11-18 NOTE — ASSESSMENT & PLAN NOTE
POD #1 s/p (REPAIR HERNIA INGUINAL.  LAPAROSCOPIC with mesh )  Surgery team primary  Diet and pain control as per primary  Currently NPO

## 2023-11-18 NOTE — PLAN OF CARE
Problem: Potential for Falls  Goal: Patient will remain free of falls  Description: INTERVENTIONS:  - Educate patient/family on patient safety including physical limitations  - Instruct patient to call for assistance with activity   - Consult OT/PT to assist with strengthening/mobility   - Keep Call bell within reach  - Keep bed low and locked with side rails adjusted as appropriate  - Keep care items and personal belongings within reach  - Initiate and maintain comfort rounds  - Make Fall Risk Sign visible to staff  - Offer Toileting every 2 Hours, in advance of need  - Initiate/Maintain bed alarm  - Obtain necessary fall risk management equipment: N/A  - Apply yellow socks and bracelet for high fall risk patients  - Consider moving patient to room near nurses station  Outcome: Progressing

## 2023-11-18 NOTE — PLAN OF CARE
Problem: Potential for Falls  Goal: Patient will remain free of falls  Description: INTERVENTIONS:  - Educate patient/family on patient safety including physical limitations  - Instruct patient to call for assistance with activity   - Consult OT/PT to assist with strengthening/mobility   - Keep Call bell within reach  - Keep bed low and locked with side rails adjusted as appropriate  - Keep care items and personal belongings within reach  - Initiate and maintain comfort rounds  - Make Fall Risk Sign visible to staff  - Offer Toileting every 3 Hours, in advance of need  - Initiate/Maintain bed alarm  - Obtain necessary fall risk management equipment:  - Apply yellow socks and bracelet for high fall risk patients  - Consider moving patient to room near nurses station  Outcome: Progressing

## 2023-11-18 NOTE — UTILIZATION REVIEW
Initial Clinical Review    Admission: Date/Time/Statement:   Admission Orders (From admission, onward)       Ordered        11/17/23 1827  Inpatient Admission  Once                          Orders Placed This Encounter   Procedures    Inpatient Admission     Standing Status:   Standing     Number of Occurrences:   1     Order Specific Question:   Level of Care     Answer:   Med Surg [16]     Order Specific Question:   Estimated length of stay     Answer:   More than 2 Midnights     Order Specific Question:   Certification     Answer:   I certify that inpatient services are medically necessary for this patient for a duration of greater than two midnights. See H&P and MD Progress Notes for additional information about the patient's course of treatment. ED Arrival Information       Expected   -    Arrival   11/17/2023 13:02    Acuity   Emergent              Means of arrival   Wheelchair    Escorted by   Self    Service   Surgery-General    Admission type   Emergency              Arrival complaint   GROIN PAIN             Chief Complaint   Patient presents with    Hernia     Pt presents in wheelchair c/o "right lower hernia that is causing me a lot of pain."       Initial Presentation: 48 y.o. male to ED from home w/ c/o  8-hour history of increasing abdominal pain and right groin pain, associated with nausea and vomiting and at least one occasion. The patient is known to have right inguinal hernia for many years, he usually gets sick after eating, associated with nausea and vomiting several times in a week. Found to have incarcerated R inguinal hernia . Admitted IP status w/ plan for OR , NPO .   11/17 OP Note   Right - REPAIR HERNIA INGUINAL. LAPAROSCOPIC with mesh   Operative Findings:  The patient had a large right indirect inguinal hernia, the previous blood was placed in the direct defect. There was incarceration of cecum within the hernia sac.   The rest of the abdominal cavity showed no evidence of inflammatory or neoplastic process. Date: 11/18   Day 2:   POD1 s/p laparoscopic repair of incarcerated right inguinal hernia . + flatus, abdomen soft, nondistended, normoactive bowel sounds . Plan to remove NGT , adv to clear liq diet as janelle . Trend labs , monitor hgb , wbc , vitals . I&O , serial exams , pain control .       Wt Readings from Last 1 Encounters:   11/17/23 94.4 kg (208 lb 1.8 oz)     Additional Vital Signs:   Date/Time Temp Pulse Resp BP MAP (mmHg) SpO2 O2 Flow Rate (L/min) O2 Device Patient Position - Orthostatic VS   11/18/23 07:11:23 98.6 °F (37 °C) 60 16 167/93 118 94 % -- -- --   11/18/23 06:22:52 -- 57 -- 155/97 116 94 % -- -- --   11/18/23 0327 -- -- -- 160/96 117 -- -- -- Lying   11/18/23 02:02:10 98.1 °F (36.7 °C) 66 16 173/97 Abnormal  122 93 % -- -- Lying   11/18/23 01:02:42 -- 59 -- 176/96 Abnormal  123 96 % -- -- --   11/18/23 0011 -- -- 16 -- -- -- -- -- Lying   11/18/23 00:03:28 -- 68 16 160/94 116 95 % -- -- --   11/17/23 23:01:26 98.2 °F (36.8 °C) 63 16 178/97 Abnormal  124 95 % -- -- Lying   11/17/23 2148 -- -- 16 -- -- -- -- -- Lying   11/17/23 21:46:26 98.3 °F (36.8 °C) 69 16 177/98 Abnormal  124 94 % -- -- --   11/17/23 2119 -- -- -- 176/94 Abnormal  -- -- -- -- --   11/17/23 2115 -- -- -- 185/99 Abnormal  -- -- -- None (Room air) --   11/17/23 2100 -- -- -- -- -- -- -- None (Room air) --   11/17/23 2059 -- -- -- 188/107 Abnormal  -- -- -- -- --   11/17/23 2045 -- 61 13 184/90 Abnormal  128 98 % -- None (Room air) --   11/17/23 2040 99 °F (37.2 °C) 59 14 193/112 Abnormal  -- 100 % 5 L/min Simple mask --   11/17/23 1730 -- 65 22 183/97 Abnormal  133 97 % -- -- --   11/17/23 1630 -- 64 22 188/106 Abnormal  141 100 % -- -- --   11/17/23 1530 -- 68 22 194/96 Abnormal  136 98 % -- None (Room air) --   11/17/23 1500 -- 81 20 187/88 Abnormal  129 98 % -- None (Room air) Lying   11/17/23 1400 -- 70 17 209/106 Abnormal  150 100 % -- None (Room air      Pertinent Labs/Diagnostic Test Results:   CT abdomen pelvis with contrast   Final Result by Beatris Tucker MD (11/17 1550)      1. Large right inguinal hernia containing part of the distal ileum and ascending colon with a moderate amount of fluid within the hernia sac. Associated mild prominence of the adjacent proximal small bowel loops which may reflect early bowel obstruction. Recommend surgical consult. 2. Couple of left lower lobe solid pulmonary nodules measuring 3 mm. Based on current Fleischner Society 2017 Guidelines on incidental pulmonary nodule, no routine follow-up is needed if the patient is considered low risk for lung cancer. If the patient    is considered high risk for lung cancer, 12 month follow-up non-contrast chest CT is recommended. I personally discussed this study with [facility] on 11/17/2023 3:50 PM.            Resident: Robby Ortiz, the attending radiologist, have reviewed the images and agree with the final report above.       Workstation performed: [id number]               Results from last 7 days   Lab Units 11/18/23  0617 11/17/23  1854 11/17/23  1334   WBC Thousand/uL 9.83  --  11.75*   HEMOGLOBIN g/dL 13.9  --  15.5   HEMATOCRIT % 41.3  --  44.8   PLATELETS Thousands/uL 269 290 317   NEUTROS ABS Thousands/µL 7.49  --  8.79*         Results from last 7 days   Lab Units 11/18/23  0617 11/17/23  1334   SODIUM mmol/L 140 139   POTASSIUM mmol/L 3.6 3.7   CHLORIDE mmol/L 104 103   CO2 mmol/L 30 28   ANION GAP mmol/L 6 8   BUN mg/dL 10 11   CREATININE mg/dL 1.02 1.10   EGFR ml/min/1.73sq m 83 76   CALCIUM mg/dL 9.1 9.7     Results from last 7 days   Lab Units 11/17/23  1334   AST U/L 25   ALT U/L 16   ALK PHOS U/L 56   TOTAL PROTEIN g/dL 8.1   ALBUMIN g/dL 4.8   TOTAL BILIRUBIN mg/dL 0.79     Results from last 7 days   Lab Units 11/18/23  0617 11/17/23  1334   GLUCOSE RANDOM mg/dL 91 86           Results from last 7 days   Lab Units 11/17/23  1334   LACTIC ACID mmol/L 1.2       Results from last 7 days   Lab Units 11/17/23  1334   LIPASE u/L 13         ED Treatment:   Medication Administration from 11/17/2023 1301 to 11/17/2023 1901         Date/Time Order Dose Route Action     11/17/2023 1336 EST morphine injection 6 mg 6 mg Intravenous Given     11/17/2023 1336 EST ketorolac (TORADOL) injection 15 mg 15 mg Intravenous Given     11/17/2023 1336 EST sodium chloride 0.9 % bolus 1,000 mL 1,000 mL Intravenous New Bag     11/17/2023 1336 EST ondansetron (ZOFRAN) injection 4 mg 4 mg Intravenous Given     11/17/2023 1659 EST morphine injection 6 mg 6 mg Intravenous Given     11/17/2023 1659 EST ondansetron (ZOFRAN) injection 4 mg 4 mg Intravenous Given     11/17/2023 1856 EST lactated ringers infusion 125 mL/hr Intravenous New Bag     11/17/2023 1842 EST lidocaine (URO-JET) 2 % urethral/mucosal gel 1 Application 1 Application Topical Given by Other          History reviewed. No pertinent past medical history.   Present on Admission:  **None**      Admitting Diagnosis: Inguinal hernia [K40.90]  Primary hypertension [I10]  Nausea & vomiting [R11.2]  Hernia of abdominal wall [K43.9]  Right lower quadrant abdominal pain [R10.31]  Incarcerated right inguinal hernia [K40.30]  Age/Sex: 48 y.o. male  Admission Orders:  Scheduled Medications:  acetaminophen, 1,000 mg, Intravenous, Q8H  nicotine, 1 patch, Transdermal, Daily  pantoprazole, 40 mg, Intravenous, Q24H BREA      Continuous IV Infusions:  lactated ringers, 125 mL/hr, Intravenous, Continuous      PRN Meds:  hydrALAZINE, 5 mg, Intravenous, Q6H PRN  lactated ringers, 1,000 mL, Intravenous, Once PRN   And  lactated ringers, 1,000 mL, Intravenous, Once PRN  morphine injection, 2 mg, Intravenous, Q2H PRN  ondansetron, 4 mg, Intravenous, Q6H PRN  11/17  x2  sodium chloride, 1,000 mL, Intravenous, Once PRN   And  sodium chloride, 1,000 mL, Intravenous, Once PRN    I&O   Cont pulse ox   NG LIS   NPO   Up w/ assist     IP CONSULT TO INTERNAL MEDICINE    Network Utilization Review Department  ATTENTION: Please call with any questions or concerns to [phone] and carefully listen to the prompts so that you are directed to the right person. All voicemails are confidential.   For Discharge needs, contact Care Management DC Support Team at [phone] opt. 2  Send all requests for admission clinical reviews, approved or denied determinations and any other requests to dedicated fax number below belonging to the campus where the patient is receiving treatment.  List of dedicated fax numbers for the Facilities:  [place] DENIALS (Administrative/Medical Necessity) [phone]   DISCHARGE SUPPORT TEAM (NETWORK) [address] [facility] (Maternity/NICU/Pediatrics) [phone]   [address] [place] [address] [facility] [phone]   [address] [place] [address] [address] [facility] [address] [address] [facility] [address] [phone]

## 2023-11-18 NOTE — ASSESSMENT & PLAN NOTE
Tobacco Use: High Risk (11/18/2023)    Patient History     Smoking Tobacco Use: Every Day     Smokeless Tobacco Use: Never     Passive Exposure: Not on file    - counseled for complete cessation for greater than 3 mins

## 2023-11-18 NOTE — DISCHARGE INSTR - AVS FIRST PAGE
Follow up: Following discharge from the hospital call the office in 1-2 days to set up a post operative appointment to be seen in 2 weeks by Dr. Shine Del Real   [phone]  Call the office if you have increased pain not relieved with pain medicine. Call the office if you have a fever,redness, the wound opens up, you have pus draining from your incision. AFTER YOU LEAVE: Following discharge from the hospital, you may have some questions about your procedure, your activities or your general condition. These instructions may answer some of your questions and help you adjust during the first few days following your operation. You can expect to be sore and tender mostly around the incisions. This pain should last approximally 5 days and gradually improve daily. Incisions:    - You may apply ice to the incisions to help with pain. Avoid heat as this may make the glue tacky   - It is normal to have some bruising, swelling or mild discoloration around the incision. If increasing redness or pain develops, call our office immediately. Do not apply any creams, lotions, or ointments. If you have dressings:  - You may remove the gauze dressing from your incisions 48 hours after surgery. Underneath this dressing is a tape like dressing called Steri Strips. Leave the steri strips in place for 5-7 days. They may fall off on their own. This is OK. Bathing:   - You may shower daily with soap and water the day after the procedure. It is OK to GENTLY wash the incision with soap and water then pat dry. DO NOT SCRUB. Do NOT soak incision in a tub, pool, or hot tub for 2 weeks. Diet:   - Resume your normal diet unless specified otherwise. We recommend you slowly advance your diet. Try to start with softer bland foods and gradually advance as tolerated. Be sure to consume plenty of water. Avoid alcohol.         Activity/Restrictions:   - The evening following the procedure you should rest as much as possible, sitting, lying or reclining. you should be sure someone remains with you until the next morning. Gradually increase your activity daily. Walking 3-4 times daily is good and stairs are ok. Listen to your body. If you start to get tired or sore then rest.   - No strenuous activity or exercise for 3-4 weeks. - No heavy lifting, pushing or pulling.   - No driving for 5 days or while taking narcotics for pain. Return or work: You may return to work or other activities as soon as your pain is controlled and you feel comfortable. For many people, this is 5 to 7 days after surgery. If your job requires heavy lifting you will need to be on light duty for 2-3 weeks. Medication:   - If you were given a prescription for Percocet, Norco, or Vicodin for pain be sure to eat prior to taking as these medications as they may cause nausea and vomiting on an empty stomach.    - If you do not want to take stronger medications for pain, you may take Tylenol, Aleve OR Ibuprofen  - DO NOT take Tylenol  (acetaminophen) with these medication for a fever or for further pain control as these medications already contain Tylenol in them. Take one or the other. Do not exceed more than 4000 mg of acetaminophen in 24 hours or 3000 mg if you have liver disease. - If you were given an antibiotic take it until it is finished. Contact your healthcare provider if:   You have a fever over 101°F (38°C) or chills. You have pain or nausea that is not relieved by medicine. You have redness and swelling around your incisions, or blood or pus is leaking from your incisions. You are constipated or have diarrhea. Your skin or eyes are yellow, or your bowel movements are pale. You have questions or concerns about your surgery, condition, or care. Seek care immediately or call 911 if:   You cannot stop vomiting. Your bowel movements are black or bloody. You have pain in your abdomen and it is swollen or hard.     Your arm or leg feels warm, tender, and painful. It may look swollen and red. You feel lightheaded, short of breath, and have chest pain. You cough up blood.

## 2023-11-18 NOTE — ANESTHESIA POSTPROCEDURE EVALUATION
Post-Op Assessment Note    CV Status:  Stable  Pain Score: 0    Pain management: adequate       Mental Status:  Sleepy   Hydration Status:  Stable   PONV Controlled:  None   Airway Patency:  Patent  Airway: intubated     Post Op Vitals Reviewed: Yes    No anethesia notable event occurred.     Staff: CRNA               BP  168/112   Temp   97.4   Pulse  68   Resp   16   SpO2   98

## 2023-11-18 NOTE — OP NOTE
OPERATIVE REPORT  PATIENT NAME: Maday Fox    :  1970  MRN: [id number]  Pt Location: [facility] OR ROOM 03    SURGERY DATE: 2023    Surgeon(s) and Role:     * Patricio Alcantara MD - Primary     * Brooklynn Shwa PA-C - Assisting    Preop Diagnosis:  Incarcerated right inguinal hernia [K40.30]    Post-Op Diagnosis Codes:     * Incarcerated right inguinal hernia [K40.30]    Procedure(s):  Right - REPAIR HERNIA INGUINAL. LAPAROSCOPIC with mesh    Specimen(s):  None    Estimated Blood Loss:   Minimal    Drains:  NG/OG/Enteral Tube Nasogastric 14 Fr Left nare (Active)   Placement Reverification Auscultation 23   Site Assessment Clean;Dry; Intact 23   Status Suction-low intermittent 23   Drainage Appearance Bile 23   Number of days: 0   None    Anesthesia Type:   General    Operative Indications:  Incarcerated right inguinal hernia [K40.30]    Operative Findings:  The patient had a large right indirect inguinal hernia, the previous blood was placed in the direct defect. There was incarceration of cecum within the hernia sac. The rest of the abdominal cavity showed no evidence of inflammatory or neoplastic process. Complications:   None    Procedure and Technique:  The patient was identified in the patient was placed in the operating table in a supine position. After adequate anesthesia induction and satisfactory endotracheal intubation the abdomen was prepped and draped in sterile usual fashion with ChloraPrep. Time-out was called the patient was identified as was surgical site. An incision was made on the left side of the abdomen and 5 mm trocar was introduced under direct vision with the help of Visiport. After verifying the position of the abdomen insufflated with CO2. After obtaining adequate pneumoperitoneum the scope was advanced and exploration was performed with the above findings.   5 millimeter trocar was introduced in the supraumbilical area and 11 mm trocar was placed on the left side of the abdomen, both of them under direct vision. The cecum was reduced after induction of the anesthesia and inspection of the cecum shows some ecchymosis of the serosa, otherwise healthy bowel wall. At this point I proceeded to score the peritoneum on the right side, symphysis pubis and Yash's ligament on the right side were identified, then the hernia sac was dissected from the internal ring, tedious dissection due to the size of the hernia sac, peritoneal reflection was dissected off the cord structures as high as a superior iliac spine on the right side. Once that was accomplished 10 x 15 cm Progrip mesh was placed and secured to the anterior abdominal wall covering the direct, indirect and femoral defects. Once that was accomplished the peritoneum was closed with the 2 OV lock Endo Stitch in a continuous fashion. At this point the ports were removed under direct vision with no evidence of bleeding from the abdominal wall. The 11 mm port site fascia was closed with 0 Vicryl interrupted figure-of-eight fashion. Subcutaneous tissue was infiltrated with 0.25% Marcaine the skin was closed with 4 O Vicryl in an interrupted subcuticular fashion. Sterile dressing were applied. At the end of the case instrument, needles, and sponges counts were correct. The patient tolerated the procedure well. I was present for the entire procedure., A qualified resident physician was not available. , and A physician assistant was required during the procedure for retraction, tissue handling, dissection and suturing.     Patient Disposition:  PACU , hemodynamically stable, and extubated and stable        SIGNATURE: Daphnie Medina MD  DATE: November 17, 2023  TIME: 8:22 PM

## 2023-11-18 NOTE — ASSESSMENT & PLAN NOTE
/93   Pulse 60   Temp 98.6 °F (37 °C)   Resp 16   Ht 6' 2" (1.88 m)   Wt 94.4 kg (208 lb 1.8 oz)   SpO2 94%   BMI 26.72 kg/m²   - currently on IV hydralazine. Has received 2 doses so far. Has also received a dose of IV labetalool  - HR slightly low, plan to continue with IV hydralazine PRN for sbp>170  - once off NPO status, start amlodipine 5mg and uptitrate depending on hospital stay. Renal functions are stable and BG is also stable.  Will need close OP ambulatory BP management

## 2023-11-18 NOTE — CONSULTS
[address]  Consult  Name: Katy Donato 48 y.o. male I MRN: [id number]  Unit/Bed#: -01 I Date of Admission: 11/17/2023   Date of Service: 11/18/2023 I Hospital Day: 1    Inpatient consult to Internal Medicine  Consult performed by: Dania Gonzalez MD  Consult ordered by: Jamal Luciano MD          Assessment/Plan   * Incarcerated inguinal hernia, unilateral  Assessment & Plan  POD #1 s/p (REPAIR HERNIA INGUINAL. LAPAROSCOPIC with mesh )  Surgery team primary  Diet and pain control as per primary  Currently NPO    Uncontrolled HTN  Assessment & Plan  /93   Pulse 60   Temp 98.6 °F (37 °C)   Resp 16   Ht 6' 2" (1.88 m)   Wt 94.4 kg (208 lb 1.8 oz)   SpO2 94%   BMI 26.72 kg/m²   - currently on IV hydralazine. Has received 2 doses so far. Has also received a dose of IV labetalool  - HR slightly low, plan to continue with IV hydralazine PRN for sbp>170  - once off NPO status, start amlodipine 5mg and uptitrate depending on hospital stay. Renal functions are stable and BG is also stable. Will need close OP ambulatory BP management    Smoking  Assessment & Plan  Tobacco Use: High Risk (11/18/2023)    Patient History     Smoking Tobacco Use: Every Day     Smokeless Tobacco Use: Never     Passive Exposure: Not on file    - counseled for complete cessation for greater than 3 mins             VTE Prophylaxis:    heparin    Mobility:   Basic Mobility Inpatient Raw Score: 18  JH-HLM Goal: 6: Walk 10 steps or more  JH-HLM Achieved: 6: Walk 10 steps or more  HLM Goal achieved. Continue to encourage appropriate mobility. Recommendations for Discharge:  Medically stable for DC if pressures continue to be controlled. Total Time Spent on Date of Encounter in care of patient: 65 mins.  This time was spent on one or more of the following: performing physical exam; counseling and coordination of care; obtaining or reviewing history; documenting in the medical record; reviewing/ordering tests, medications or procedures; communicating with other healthcare professionals and discussing with patient's family/caregivers. Collaboration of Care: Were Recommendations Directly Discussed with Primary Treatment Team? Yes    History of Present Illness:  Annemarie Sanchez is a 48 y.o. male who is originally admitted to the general surgical service for incarcerated inguinal hernia and is now status post hernia repair and mesh plasty . We are consulted for medical management. Patient has not seen his primary care physician for almost 10 years. His blood pressures have been elevated since the time of admission. Initially on 11/17/2023 it appears that his blood pressures meet hypertensive urgency criteria them being 237/113. Since then they have been better controlled and today it is 167/93. Patient does not take any blood pressure medications at home. Denies any chest pain, shortness of breath, nausea, vomiting, diarrhea, lightheadedness, dizziness, headaches, syncope, loss of consciousness. Review of Systems:  Review of Systems   Constitutional:  Negative for chills and fever. HENT:  Negative for ear pain and sore throat. Eyes:  Negative for pain and visual disturbance. Respiratory:  Negative for cough and shortness of breath. Cardiovascular:  Negative for chest pain and palpitations. Gastrointestinal:  Negative for abdominal pain, blood in stool, constipation, diarrhea, nausea and vomiting. Genitourinary:  Negative for dysuria and hematuria. Musculoskeletal:  Negative for arthralgias and back pain. Skin:  Negative for color change and rash. Neurological:  Negative for dizziness, seizures, syncope and light-headedness. Psychiatric/Behavioral:  Negative for agitation, behavioral problems and confusion. All other systems reviewed and are negative. Past Medical and Surgical History:   History reviewed. No pertinent past medical history.     Past Surgical History:   Procedure Laterality Date    HERNIA REPAIR         Meds/Allergies:  all medications and allergies reviewed    Allergies: Allergies   Allergen Reactions    Penicillins Other (See Comments)     Pt states I've been allergic since childhood       Social History:  Marital Status: /Civil Union  Substance Use History:   Social History     Substance and Sexual Activity   Alcohol Use Never     Social History     Tobacco Use   Smoking Status Every Day    Packs/day: 0.50    Types: Cigarettes   Smokeless Tobacco Never     Social History     Substance and Sexual Activity   Drug Use Never       Family History:  History reviewed. No pertinent family history.     Physical Exam:   Vitals:   Blood Pressure: 167/93 (11/18/23 0711)  Pulse: 60 (11/18/23 0711)  Temperature: 98.6 °F (37 °C) (11/18/23 0711)  Temp Source: Oral (11/18/23 0202)  Respirations: 16 (11/18/23 0711)  Height: 6' 2" (188 cm) (11/17/23 2148)  Weight - Scale: 94.4 kg (208 lb 1.8 oz) (11/17/23 2148)  SpO2: 94 % (11/18/23 0711)    Physical Exam General- Awake, alert and oriented x 3, looks comfortable  HEENT- Normocephalic, atraumatic, oral mucosa- moist  Neck- Supple, No carotid bruit, no JVD  CVS- Normal S1/ S2, Regular rate and rhythm, No murmur, No edema  Respiratory system- B/L clear breath sounds, no wheezing  Abdomen- non tender, BS (+) , ND  Genitourinary- No suprapubic tenderness, No CVA tenderness  Skin- No new bruise or rash  Musculoskeletal- No gross deformity  Psych- No acute psychosis  CNS- CN II- XII grossly intact, No acute focal neurologic deficit noted      Additional Data:   Lab Results:    Results from last 7 days   Lab Units 11/18/23  0617   WBC Thousand/uL 9.83   HEMOGLOBIN g/dL 13.9   HEMATOCRIT % 41.3   PLATELETS Thousands/uL 269   NEUTROS PCT % 76*   LYMPHS PCT % 16   MONOS PCT % 7   EOS PCT % 1     Results from last 7 days   Lab Units 11/18/23  0617 11/17/23  1334   SODIUM mmol/L 140 139   POTASSIUM mmol/L 3.6 3.7   CHLORIDE mmol/L 104 103   CO2 mmol/L 30 28   BUN mg/dL 10 11   CREATININE mg/dL 1.02 1.10   ANION GAP mmol/L 6 8   CALCIUM mg/dL 9.1 9.7   ALBUMIN g/dL  --  4.8   TOTAL BILIRUBIN mg/dL  --  0.79   ALK PHOS U/L  --  56   ALT U/L  --  16   AST U/L  --  25   GLUCOSE RANDOM mg/dL 91 86             No results found for: "HGBA1C"      Results from last 7 days   Lab Units 11/17/23  1334   LACTIC ACID mmol/L 1.2       Imaging: No pertinent imaging reviewed. CT abdomen pelvis with contrast   Final Result by Robbie Ruth MD (11/17 0760)      1. Large right inguinal hernia containing part of the distal ileum and ascending colon with a moderate amount of fluid within the hernia sac. Associated mild prominence of the adjacent proximal small bowel loops which may reflect early bowel obstruction. Recommend surgical consult. 2. Couple of left lower lobe solid pulmonary nodules measuring 3 mm. Based on current Fleischner Society 2017 Guidelines on incidental pulmonary nodule, no routine follow-up is needed if the patient is considered low risk for lung cancer. If the patient    is considered high risk for lung cancer, 12 month follow-up non-contrast chest CT is recommended. I personally discussed this study with Missy Flanagan on 11/17/2023 3:50 PM.            Resident: Juno Kwok, the attending radiologist, have reviewed the images and agree with the final report above. Workstation performed: [id number]             EKG, Pathology, and Other Studies Reviewed on Admission:   EKG: No EKG obtained. ** Please Note: This note may have been constructed using a voice recognition system.  **

## 2023-11-18 NOTE — PROGRESS NOTES
Progress Note - General Surgery   Ceil Standard 48 y.o. male MRN: [id number]  Unit/Bed#: -01 Encounter: [id number]    Assessment/Plan  Ceil Standard is a 48 y.o. male     POD1 s/p laparoscopic repair of incarcerated right inguinal hernia  AVSS, WBC 9.83, Hb 13.9  NGT 650cc bloody output in cannister   UOP 445cc/12hr recorded Cr 1.02  + flatus, abdomen soft, nondistended, normoactive bowel sounds, appropriate incisional tenderness to palpation, incisions c/d/I covered in dressing     Continue current care, given return of bowel function and benign abdominal exam, will remove NGT and advancing diet to clear liquid diet as tolerated. If patient is able to tolerate clear liquids, will continue advancing as tolerated  Trend labs, monitor Hb, WBC, and vitals   I/Os  Serial abdominal exams  Encourage ambulation/OOB  Pain control/Antiemetics PRN  IS 10x an hour  DVT prophylaxis   If patient able to tolerate solid diet and continues to demonstrate bowel function, anticipate discharge later today vs tomorrow with plan for routine follow up in 2 weeks with Dr. Carvel Landau     Subjective/Objective    Subjective: No acute events overnight, passed lots of flatus overnight     Objective:     Blood pressure 167/93, pulse 60, temperature 98.6 °F (37 °C), resp. rate 16, height 6' 2" (1.88 m), weight 94.4 kg (208 lb 1.8 oz), SpO2 94 %. ,Body mass index is 26.72 kg/m².       Intake/Output Summary (Last 24 hours) at 11/18/2023 1105  Last data filed at 11/18/2023 0553  Gross per 24 hour   Intake 1839.58 ml   Output 1095 ml   Net 744.58 ml       Invasive Devices       Peripheral Intravenous Line  Duration             Peripheral IV 11/17/23 Right Antecubital <1 day              Drain  Duration             NG/OG/Enteral Tube Nasogastric 16 Fr Left nare <1 day                    Physical Exam: /93   Pulse 60   Temp 98.6 °F (37 °C)   Resp 16   Ht 6' 2" (1.88 m)   Wt 94.4 kg (208 lb 1.8 oz)   SpO2 94%   BMI 26.72 kg/m²   General appearance: alert and oriented, in no acute distress  Lungs: clear to auscultation bilaterally  Heart: regular rate and rhythm, S1, S2 normal, no murmur, click, rub or gallop  Abdomen:  soft, nondistended, normoactive bowel sounds, appropriate incisional tenderness to palpation, incisions c/d/I covered in intact dressing   Extremities: extremities normal, warm and well-perfused; no cyanosis, clubbing, or edema    Lab, Imaging and other studies:I have personally reviewed pertinent lab results.      VTE Pharmacologic Prophylaxis: Heparin  VTE Mechanical Prophylaxis: sequential compression device    Recent Results (from the past 36 hour(s))   CBC and differential    Collection Time: 11/17/23  1:34 PM   Result Value Ref Range    WBC 11.75 (H) 4.31 - 10.16 Thousand/uL    RBC 4.89 3.88 - 5.62 Million/uL    Hemoglobin 15.5 12.0 - 17.0 g/dL    Hematocrit 44.8 36.5 - 49.3 %    MCV 92 82 - 98 fL    MCH 31.7 26.8 - 34.3 pg    MCHC 34.6 31.4 - 37.4 g/dL    RDW 12.5 11.6 - 15.1 %    MPV 9.3 8.9 - 12.7 fL    Platelets 966 576 - 550 Thousands/uL    nRBC 0 /100 WBCs    Neutrophils Relative 76 (H) 43 - 75 %    Immat GRANS % 0 0 - 2 %    Lymphocytes Relative 18 14 - 44 %    Monocytes Relative 5 4 - 12 %    Eosinophils Relative 1 0 - 6 %    Basophils Relative 0 0 - 1 %    Neutrophils Absolute 8.79 (H) 1.85 - 7.62 Thousands/µL    Immature Grans Absolute 0.05 0.00 - 0.20 Thousand/uL    Lymphocytes Absolute 2.06 0.60 - 4.47 Thousands/µL    Monocytes Absolute 0.64 0.17 - 1.22 Thousand/µL    Eosinophils Absolute 0.16 0.00 - 0.61 Thousand/µL    Basophils Absolute 0.05 0.00 - 0.10 Thousands/µL   Comprehensive metabolic panel    Collection Time: 11/17/23  1:34 PM   Result Value Ref Range    Sodium 139 135 - 147 mmol/L    Potassium 3.7 3.5 - 5.3 mmol/L    Chloride 103 96 - 108 mmol/L    CO2 28 21 - 32 mmol/L    ANION GAP 8 mmol/L    BUN 11 5 - 25 mg/dL    Creatinine 1.10 0.60 - 1.30 mg/dL    Glucose 86 65 - 140 mg/dL    Calcium 9.7 8.4 - 10.2 mg/dL    AST 25 13 - 39 U/L    ALT 16 7 - 52 U/L    Alkaline Phosphatase 56 34 - 104 U/L    Total Protein 8.1 6.4 - 8.4 g/dL    Albumin 4.8 3.5 - 5.0 g/dL    Total Bilirubin 0.79 0.20 - 1.00 mg/dL    eGFR 76 ml/min/1.73sq m   Lipase    Collection Time: 11/17/23  1:34 PM   Result Value Ref Range    Lipase 13 11 - 82 u/L   Lactic acid, plasma (w/reflex if result > 2.0)    Collection Time: 11/17/23  1:34 PM   Result Value Ref Range    LACTIC ACID 1.2 0.5 - 2.0 mmol/L   Platelet count    Collection Time: 11/17/23  6:54 PM   Result Value Ref Range    Platelets 131 507 - 591 Thousands/uL    MPV 9.0 8.9 - 12.7 fL   CBC and differential    Collection Time: 11/18/23  6:17 AM   Result Value Ref Range    WBC 9.83 4.31 - 10.16 Thousand/uL    RBC 4.48 3.88 - 5.62 Million/uL    Hemoglobin 13.9 12.0 - 17.0 g/dL    Hematocrit 41.3 36.5 - 49.3 %    MCV 92 82 - 98 fL    MCH 31.0 26.8 - 34.3 pg    MCHC 33.7 31.4 - 37.4 g/dL    RDW 12.5 11.6 - 15.1 %    MPV 9.6 8.9 - 12.7 fL    Platelets 893 515 - 963 Thousands/uL    nRBC 0 /100 WBCs    Neutrophils Relative 76 (H) 43 - 75 %    Immat GRANS % 0 0 - 2 %    Lymphocytes Relative 16 14 - 44 %    Monocytes Relative 7 4 - 12 %    Eosinophils Relative 1 0 - 6 %    Basophils Relative 0 0 - 1 %    Neutrophils Absolute 7.49 1.85 - 7.62 Thousands/µL    Immature Grans Absolute 0.03 0.00 - 0.20 Thousand/uL    Lymphocytes Absolute 1.52 0.60 - 4.47 Thousands/µL    Monocytes Absolute 0.67 0.17 - 1.22 Thousand/µL    Eosinophils Absolute 0.09 0.00 - 0.61 Thousand/µL    Basophils Absolute 0.03 0.00 - 0.10 Thousands/µL   Basic metabolic panel    Collection Time: 11/18/23  6:17 AM   Result Value Ref Range    Sodium 140 135 - 147 mmol/L    Potassium 3.6 3.5 - 5.3 mmol/L    Chloride 104 96 - 108 mmol/L    CO2 30 21 - 32 mmol/L    ANION GAP 6 mmol/L    BUN 10 5 - 25 mg/dL    Creatinine 1.02 0.60 - 1.30 mg/dL    Glucose 91 65 - 140 mg/dL    Calcium 9.1 8.4 - 10.2 mg/dL    eGFR 83 ml/min/1.73sq m

## 2023-11-19 VITALS
HEART RATE: 66 BPM | BODY MASS INDEX: 26.71 KG/M2 | TEMPERATURE: 98.6 F | OXYGEN SATURATION: 97 % | WEIGHT: 208.11 LBS | HEIGHT: 74 IN | DIASTOLIC BLOOD PRESSURE: 95 MMHG | RESPIRATION RATE: 19 BRPM | SYSTOLIC BLOOD PRESSURE: 168 MMHG

## 2023-11-19 PROCEDURE — 99024 POSTOP FOLLOW-UP VISIT: CPT | Performed by: PHYSICIAN ASSISTANT

## 2023-11-19 PROCEDURE — C9113 INJ PANTOPRAZOLE SODIUM, VIA: HCPCS | Performed by: PHYSICIAN ASSISTANT

## 2023-11-19 PROCEDURE — 99233 SBSQ HOSP IP/OBS HIGH 50: CPT | Performed by: FAMILY MEDICINE

## 2023-11-19 RX ORDER — AMLODIPINE BESYLATE 5 MG/1
5 TABLET ORAL DAILY
Qty: 30 TABLET | Refills: 0 | Status: CANCELLED | OUTPATIENT
Start: 2023-11-19 | End: 2023-12-19

## 2023-11-19 RX ORDER — DOCUSATE SODIUM 100 MG/1
100 CAPSULE, LIQUID FILLED ORAL 2 TIMES DAILY
Qty: 14 CAPSULE | Refills: 0 | Status: SHIPPED | OUTPATIENT
Start: 2023-11-19 | End: 2023-11-30

## 2023-11-19 RX ORDER — OXYCODONE HYDROCHLORIDE 5 MG/1
5 TABLET ORAL EVERY 4 HOURS PRN
Qty: 12 TABLET | Refills: 0 | Status: SHIPPED | OUTPATIENT
Start: 2023-11-19 | End: 2023-11-22

## 2023-11-19 RX ORDER — AMLODIPINE BESYLATE 10 MG/1
10 TABLET ORAL DAILY
Qty: 30 TABLET | Refills: 0 | Status: SHIPPED | OUTPATIENT
Start: 2023-11-19 | End: 2023-12-19

## 2023-11-19 RX ORDER — AMLODIPINE BESYLATE 5 MG/1
5 TABLET ORAL ONCE
Status: COMPLETED | OUTPATIENT
Start: 2023-11-19 | End: 2023-11-19

## 2023-11-19 RX ADMIN — ACETAMINOPHEN 1000 MG: 10 INJECTION INTRAVENOUS at 06:35

## 2023-11-19 RX ADMIN — AMLODIPINE BESYLATE 5 MG: 5 TABLET ORAL at 09:32

## 2023-11-19 RX ADMIN — PANTOPRAZOLE SODIUM 40 MG: 40 INJECTION, POWDER, FOR SOLUTION INTRAVENOUS at 08:17

## 2023-11-19 RX ADMIN — NICOTINE 1 PATCH: 7 PATCH, EXTENDED RELEASE TRANSDERMAL at 08:17

## 2023-11-19 RX ADMIN — AMLODIPINE BESYLATE 5 MG: 5 TABLET ORAL at 08:17

## 2023-11-19 NOTE — ASSESSMENT & PLAN NOTE
POD #2 s/p (REPAIR HERNIA INGUINAL.  LAPAROSCOPIC with mesh )  Surgery team primary  Diet and pain control as per primary  Currently soft surgical diet

## 2023-11-19 NOTE — PLAN OF CARE
Problem: Potential for Falls  Goal: Patient will remain free of falls  Description: INTERVENTIONS:  - Educate patient/family on patient safety including physical limitations  - Instruct patient to call for assistance with activity   - Consult OT/PT to assist with strengthening/mobility   - Keep Call bell within reach  - Keep bed low and locked with side rails adjusted as appropriate  - Keep care items and personal belongings within reach  - Initiate and maintain comfort rounds  - Make Fall Risk Sign visible to staff  - Offer Toileting every 3 Hours, in advance of need  - Initiate/Maintain bed alarm  - Obtain necessary fall risk management equipment:  - Apply yellow socks and bracelet for high fall risk patients  - Consider moving patient to room near nurses station  11/19/2023 1022 by Lavelle Kanner  Outcome: Adequate for Discharge  11/19/2023 0733 by Lavelle Kanner  Outcome: Progressing     Problem: Nutrition/Hydration-ADULT  Goal: Nutrient/Hydration intake appropriate for improving, restoring or maintaining nutritional needs  Description: Monitor and assess patient's nutrition/hydration status for malnutrition. Collaborate with interdisciplinary team and initiate plan and interventions as ordered. Monitor patient's weight and dietary intake as ordered or per policy. Utilize nutrition screening tool and intervene as necessary. Determine patient's food preferences and provide high-protein, high-caloric foods as appropriate.      INTERVENTIONS:  - Monitor oral intake, urinary output, labs, and treatment plans  - Assess nutrition and hydration status and recommend course of action  - Evaluate amount of meals eaten  - Assist patient with eating if necessary   - Allow adequate time for meals  - Recommend/ encourage appropriate diets, oral nutritional supplements, and vitamin/mineral supplements  - Order, calculate, and assess calorie counts as needed  - Recommend, monitor, and adjust tube feedings and TPN/PPN based on assessed needs  - Assess need for intravenous fluids  - Provide specific nutrition/hydration education as appropriate  - Include patient/family/caregiver in decisions related to nutrition  11/19/2023 1022 by Carlos Enrique Shannon  Outcome: Adequate for Discharge  11/19/2023 0733 by Carlos Enrique Shannon  Outcome: Progressing

## 2023-11-19 NOTE — PROGRESS NOTES
[address]  Progress Note  Name: Tammy Martinez  MRN: [id number]  Unit/Bed#: -06 I Date of Admission: 11/17/2023   Date of Service: 11/19/2023 I Hospital Day: 2    Assessment/Plan   * Incarcerated inguinal hernia, unilateral  Assessment & Plan  POD #2 s/p (REPAIR HERNIA INGUINAL. LAPAROSCOPIC with mesh )  Surgery team primary  Diet and pain control as per primary  Currently soft surgical diet    Uncontrolled HTN  Assessment & Plan  /97   Pulse 67   Temp 98.6 °F (37 °C)   Resp 19   Ht 6' 2" (1.88 m)   Wt 94.4 kg (208 lb 1.8 oz)   SpO2 93%   BMI 26.72 kg/m²   - better controlled  - recommend continuing amlodipine and increase to 10mg and uptitrate depending on hospital stay. Renal functions are stable and BG is also stable. Will need close OP ambulatory BP management    Smoking  Assessment & Plan  Tobacco Use: High Risk (11/18/2023)    Patient History     Smoking Tobacco Use: Every Day     Smokeless Tobacco Use: Never     Passive Exposure: Not on file    - counseled for complete cessation for greater than 3 mins               VTE Pharmacologic Prophylaxis:    per primary    Mobility:   Basic Mobility Inpatient Raw Score: 18  JH-HLM Goal: 6: Walk 10 steps or more  JH-HLM Achieved: 6: Walk 10 steps or more  HLM Goal achieved. Continue to encourage appropriate mobility. Patient Centered Rounds: I performed bedside rounds with nursing staff today. Discussions with Specialists or Other Care Team Provider: yes surgery    Education and Discussions with Family / Patient: Patient declined call to . Total Time Spent on Date of Encounter in care of patient: 36 mins.  This time was spent on one or more of the following: performing physical exam; counseling and coordination of care; obtaining or reviewing history; documenting in the medical record; reviewing/ordering tests, medications or procedures; communicating with other healthcare professionals and discussing with patient's family/caregivers. Current Length of Stay: 2 day(s)  Current Patient Status: Inpatient   Certification Statement:  cleared medically for DC  Discharge Plan: Leon Garsandip is following this patient on consult. They are medically stable for discharge when deemed appropriate by primary service. Code Status: Level 1 - Full Code    Subjective:   Seen and examined at bedside  No new complaints  Discussed elevated BP with the patient. He does not have a PCP and has been recommended to find a PCP and followup for ambulatory BP management. No NVD  Abd pain minimal   Passing flatus and tolerating diet  No cp or sob     Objective:     Vitals:   Temp (24hrs), Av.5 °F (36.9 °C), Min:98.3 °F (36.8 °C), Max:98.6 °F (37 °C)    Temp:  [98.3 °F (36.8 °C)-98.6 °F (37 °C)] 98.6 °F (37 °C)  HR:  [62-69] 66  Resp:  [19] 19  BP: (151-182)/() 168/95  SpO2:  [93 %-97 %] 97 %  Body mass index is 26.72 kg/m². Input and Output Summary (last 24 hours):      Intake/Output Summary (Last 24 hours) at 2023 0929  Last data filed at 2023 0701  Gross per 24 hour   Intake 1420 ml   Output 2525 ml   Net -1105 ml       Physical Exam:   Physical Exam General- Awake, alert and oriented x 3, looks comfortable  HEENT- Normocephalic, atraumatic, oral mucosa- moist  Neck- Supple, No carotid bruit, no JVD  CVS- Normal S1/ S2, Regular rate and rhythm, No murmur, No edema  Respiratory system- B/L clear breath sounds, no wheezing  Abdomen- Soft, Non distended, no tenderness, Bowel sound- present 4 quads  Genitourinary- No suprapubic tenderness, No CVA tenderness  Skin- No new bruise or rash  Musculoskeletal- No gross deformity  Psych- No acute psychosis  CNS- CN II- XII grossly intact, No acute focal neurologic deficit noted      Additional Data:     Labs:  Results from last 7 days   Lab Units 23  0617   WBC Thousand/uL 9.83   HEMOGLOBIN g/dL 13.9   HEMATOCRIT % 41.3   PLATELETS Thousands/uL 269   NEUTROS PCT % 76* LYMPHS PCT % 16   MONOS PCT % 7   EOS PCT % 1     Results from last 7 days   Lab Units 11/18/23  0617 11/17/23  1334   SODIUM mmol/L 140 139   POTASSIUM mmol/L 3.6 3.7   CHLORIDE mmol/L 104 103   CO2 mmol/L 30 28   BUN mg/dL 10 11   CREATININE mg/dL 1.02 1.10   ANION GAP mmol/L 6 8   CALCIUM mg/dL 9.1 9.7   ALBUMIN g/dL  --  4.8   TOTAL BILIRUBIN mg/dL  --  0.79   ALK PHOS U/L  --  56   ALT U/L  --  16   AST U/L  --  25   GLUCOSE RANDOM mg/dL 91 86                 Results from last 7 days   Lab Units 11/17/23  1334   LACTIC ACID mmol/L 1.2       Lines/Drains:  Invasive Devices       Peripheral Intravenous Line  Duration             Peripheral IV 11/17/23 Right Antecubital 1 day                          Imaging: No pertinent imaging reviewed. Recent Cultures (last 7 days):         Last 24 Hours Medication List:   Current Facility-Administered Medications   Medication Dose Route Frequency Provider Last Rate    acetaminophen  1,000 mg Intravenous Q8H Brooklynn Valeria, PA-C 1,000 mg (11/19/23 6358)    amLODIPine  5 mg Oral Daily Mariama Barrientos MD      amLODIPine  5 mg Oral Once Mariama Barrientos MD      hydrALAZINE  5 mg Intravenous Q6H PRN Gopi Wang PA-C      lactated ringers  125 mL/hr Intravenous Continuous Brooklynn Valeria, PA-C 125 mL/hr (11/18/23 2317)    melatonin  6 mg Oral HS PRN Tati Acharya PA-C      morphine injection  2 mg Intravenous Q2H PRN Gopi Wang PA-C      nicotine  1 patch Transdermal Daily Brooklynn Valeria, PA-C      ondansetron  4 mg Intravenous Q6H PRN Brooklynn Valeria, PA-C      pantoprazole  40 mg Intravenous Q24H [address] Brooklynn Valeria PA-C          Today, Patient Was Seen By: Mariama Barrientos MD    **Please Note: This note may have been constructed using a voice recognition system. **

## 2023-11-19 NOTE — PLAN OF CARE
Problem: Potential for Falls  Goal: Patient will remain free of falls  Description: INTERVENTIONS:  - Educate patient/family on patient safety including physical limitations  - Instruct patient to call for assistance with activity   - Consult OT/PT to assist with strengthening/mobility   - Keep Call bell within reach  - Keep bed low and locked with side rails adjusted as appropriate  - Keep care items and personal belongings within reach  - Initiate and maintain comfort rounds  - Make Fall Risk Sign visible to staff  - Offer Toileting every 3 Hours, in advance of need  - Initiate/Maintain bed alarm  - Obtain necessary fall risk management equipment:  - Apply yellow socks and bracelet for high fall risk patients  - Consider moving patient to room near nurses station  Outcome: Progressing     Problem: Nutrition/Hydration-ADULT  Goal: Nutrient/Hydration intake appropriate for improving, restoring or maintaining nutritional needs  Description: Monitor and assess patient's nutrition/hydration status for malnutrition. Collaborate with interdisciplinary team and initiate plan and interventions as ordered. Monitor patient's weight and dietary intake as ordered or per policy. Utilize nutrition screening tool and intervene as necessary. Determine patient's food preferences and provide high-protein, high-caloric foods as appropriate.      INTERVENTIONS:  - Monitor oral intake, urinary output, labs, and treatment plans  - Assess nutrition and hydration status and recommend course of action  - Evaluate amount of meals eaten  - Assist patient with eating if necessary   - Allow adequate time for meals  - Recommend/ encourage appropriate diets, oral nutritional supplements, and vitamin/mineral supplements  - Order, calculate, and assess calorie counts as needed  - Recommend, monitor, and adjust tube feedings and TPN/PPN based on assessed needs  - Assess need for intravenous fluids  - Provide specific nutrition/hydration education as appropriate  - Include patient/family/caregiver in decisions related to nutrition  Outcome: Progressing

## 2023-11-19 NOTE — ASSESSMENT & PLAN NOTE
Tobacco Use: High Risk (11/18/2023)    Patient History    • Smoking Tobacco Use: Every Day    • Smokeless Tobacco Use: Never    • Passive Exposure: Not on file    - counseled for complete cessation for greater than 3 mins

## 2023-11-19 NOTE — DISCHARGE SUMMARY
Discharge Date: Discharge Summary - Kavita Handy 48 y.o. male MRN: [id number]    Unit/Bed#: -01 Encounter: [id number]    Admission Date: 11/17/2023   Discharge Date: 11/19/2023    Admitting Diagnosis:   Inguinal hernia [K40.90]  Primary hypertension [I10]  Nausea & vomiting [R11.2]  Hernia of abdominal wall [K43.9]  Right lower quadrant abdominal pain [R10.31]  Incarcerated right inguinal hernia [K40.30]    Principle/ Secondary Diagnosis:  History reviewed. No pertinent past medical history. Past Surgical History:   Procedure Laterality Date    HERNIA REPAIR         Discharge Diagnoses:   Principal Problem:    Unilateral inguinal hernia with obstruction and without gangrene  Active Problems:    Smoking    Uncontrolled HTN      Procedures Performed:  No orders of the defined types were placed in this encounter. REPAIR HERNIA INGUINAL, LAPAROSCOPIC with mesh (Right: Groin)  Procedure(s):  REPAIR HERNIA INGUINAL, LAPAROSCOPIC with mesh    Consultants:     Internal Medicine     HPI: As per Bibi Michaud MD: "Kavita Handy is a 48 y.o. male who presents to the emergency room complaining of 8-hour history of increasing abdominal pain and right groin pain, associated with nausea and vomiting and at least one occasion. The patient is known to have right inguinal hernia for many years, he usually gets sick after eating, associated with nausea and vomiting several times in a week. Patient stated that this time was different and was getting worse, he denies having any fever, chills, diarrhea, constipation or urinary symptoms. Patient had right inguinal hernia repair many years ago at [facility]."    Summary of Hospital Course: The patient underwent a laparoscopic right inguinal hernia repair with mesh. Intraoperative findings showed a large right indirect inguinal hernia with incarceration of the cecum within the hernia sac. The patient tolerated the procedure well and was transferred to the floor.  POD1 the patient noted return of bowel function with flatus and NGT was removed and clear liquid diet was started and gradually advanced as tolerated. POD2 the patient was noted to be doing well, tolerating solid diet and exhibiting regular bowel function and he as deemed appropriate for discharge  home with instructions to follow up in 2 weeks in the office for routine postoperative visit. All questions and concerns were addressed, all discharge instructions were reviewed with the patient prior to discharge     Physical Exam: /95   Pulse 66   Temp 98.6 °F (37 °C)   Resp 19   Ht 6' 2" (1.88 m)   Wt 94.4 kg (208 lb 1.8 oz)   SpO2 97%   BMI 26.72 kg/m²   General appearance: alert and oriented, in no acute distress  Lungs: clear to auscultation bilaterally  Heart: regular rate and rhythm, S1, S2 normal, no murmur, click, rub or gallop  Abdomen:  soft, nondistended, normoactive bowel sounds, incisions c/d/I covered with steristrips, no surrounding erythema or edema, appropriate incisional tenderness to palpation   Extremities: extremities normal, warm and well-perfused; no cyanosis, clubbing, or edema    Significant Findings, Care, Treatment and Services Provided: See Above  CT abdomen pelvis with contrast    Result Date: 11/17/2023  Impression: 1. Large right inguinal hernia containing part of the distal ileum and ascending colon with a moderate amount of fluid within the hernia sac. Associated mild prominence of the adjacent proximal small bowel loops which may reflect early bowel obstruction. Recommend surgical consult. 2. Couple of left lower lobe solid pulmonary nodules measuring 3 mm. Based on current Fleischner Society 2017 Guidelines on incidental pulmonary nodule, no routine follow-up is needed if the patient is considered low risk for lung cancer. If the patient  is considered high risk for lung cancer, 12 month follow-up non-contrast chest CT is recommended.  I personally discussed this study with Parth Hurtado on 11/17/2023 3:50 PM. Resident: Nikki Elena, the attending radiologist, have reviewed the images and agree with the final report above. Workstation performed: [id number]     Complications: None    Discharge Diagnosis: Incarcerated right inguinal hernia     Medical Problems       Resolved Problems  Date Reviewed: 11/19/2023   None       Principal Problem:    Unilateral inguinal hernia with obstruction and without gangrene  Active Problems:    Smoking    Uncontrolled HTN      Condition at Discharge: good         Discharge instructions/Information to patient and family:   See after visit summary for information provided to patient and family. Provisions for Follow-Up Care:  See after visit summary for information related to follow-up care and any pertinent home health orders. PCP: No primary care provider on file. Disposition: See After Visit Summary for discharge disposition information. Planned Readmission: No    Discharge Statement   I spent 30 minutes discharging the patient. This time was spent on the day of discharge. I had direct contact with the patient on the day of discharge. Additional documentation is required if more than 30 minutes were spent on discharge. Discharge Medications:  See after visit summary for reconciled discharge medications provided to patient and family.

## 2023-11-19 NOTE — CASE MANAGEMENT
Case Management Assessment & Discharge Planning Note    Patient name Zuleyka Connor  [id number] [id number]  : 1970 Date 2023       Current Admission Date: 2023  Current Admission Diagnosis:Incarcerated inguinal hernia, unilateral   Patient Active Problem List    Diagnosis Date Noted    Uncontrolled HTN 2023    Smoking 2023    Incarcerated inguinal hernia, unilateral 2023      LOS (days): 2  Geometric Mean LOS (GMLOS) (days):   Days to GMLOS:     OBJECTIVE:    Risk of Unplanned Readmission Score: 6.26         Current admission status: Inpatient       Preferred Pharmacy:   [id number] [address] [address]  Phone: [phone] Fax: [phone]    Primary Care Provider: No primary care provider on file. Primary Insurance:   Secondary Insurance:     ASSESSMENT:  Active Health Care Proxies    There are no active Health Care Proxies on file. Advance Directives  Does patient have a [facility]?: No  Was patient offered paperwork?: Yes (declined)  Does patient currently have a Health Care decision maker?: Yes, please see Health Care Proxy section  Does patient have Advance Directives?: No  Was patient offered paperwork?: Yes (declined)  Primary Contact: Emogene Nageotte         Readmission Root Cause  30 Day Readmission: No    Patient Information  Admitted from[de-identified] Home  Mental Status: Alert  During Assessment patient was accompanied by: Spouse  Assessment information provided by[de-identified] Patient  Primary Caregiver: Self  Support Systems: Spouse/significant other  [place] [facility]: [address] [place] [address] do you live in?: [address] [place] [address] entry access options.  Select all that apply.: Stairs  Number of steps to enter home.: 2  Do the steps have railings?: Yes  Type of Current Residence: [address] [place] [address] home  Upon entering residence, is there a bedroom on the main floor (no further steps)?: Yes  Upon entering residence, is there a bathroom on the main floor (no further steps)?: Yes  Living Arrangements: Lives w/ Spouse/significant other  Is patient a ?: No    Activities of Daily Living Prior to Admission  Functional Status: Independent  Completes ADLs independently?: Yes  Ambulates independently?: Yes  Does patient use assisted devices?: No  Does patient currently own DME?: No  Does patient have a history of Outpatient Therapy (PT/OT)?: Yes  Does the patient have a history of Short-Term Rehab?: No  Does patient have a history of HHC?: No  Does patient currently have [address]?: No         Patient Information Continued  Income Source: Employed  Does patient have prescription coverage?: Yes  Does patient receive dialysis treatments?: Yes  Does patient have a history of substance abuse?: No  Does patient have a history of Mental Health Diagnosis?: No    PHQ 2/9 Screening   Reviewed PHQ 2/9 Depression Screening Score?: No    Means of Transportation  Means of Transport to Appts[de-identified] Drives Self      Housing Stability: Not on file   Food Insecurity: Not on file   Transportation Needs: Not on file   Utilities: Not on file       DISCHARGE DETAILS:    Discharge planning discussed with[de-identified] patient and wife Lexie Renee of Choice: Yes  Comments - Freedom of Choice: Marcell Shaw informed CM that pt will need a BP check in 3 days. CM contacted the [address] [place] [place] [address] in [place] ([place]) and spoke to Kei Joiner. He took all info on pt and will have him notified on Monday. CM also provided a list of [facility] facilities that pt can just walk in and be checked. A list of "Finding a [facility] Physician" was provided also. Financial was notified of no insurance status.   CM contacted family/caregiver?: Yes  Were Treatment Team discharge recommendations reviewed with patient/caregiver?: Yes  Did patient/caregiver verbalize understanding of patient care needs?: Yes  Were patient/caregiver advised of the risks associated with not following Treatment Team discharge recommendations?: Yes    Contacts  Patient Contacts: Donell Roger  Relationship to Patient[de-identified] Family  Contact Method: In Person  Reason/Outcome: Discharge Planning, Continuity of [address]         Is the patient interested in [facility] at discharge?: No    DME Referral Provided  Referral made for DME?: No    Other Referral/Resources/Interventions Provided:  Financial Resources Provided: Financial Counselor  Interventions: InfoLink  Referral Comments: Demarco Choe informed CM that pt will need a BP check in 3 days. CM contacted the [address] [place] [address] in [place] ([place]) and spoke to Minerva Cote. He took all info on pt and will have him notified on Monday. CM also provided a list of [facility] facilities that pt can just walk in and be checked. A list of "Finding a [facility] Physician" was provided also. Financial was notified of no insurance status.     Would you like to participate in our [address] service program?  : No - Declined    Treatment Team Recommendation: Home  Discharge Destination Plan[de-identified] Home  Transport at Discharge : Family

## 2023-11-19 NOTE — ASSESSMENT & PLAN NOTE
/97   Pulse 67   Temp 98.6 °F (37 °C)   Resp 19   Ht 6' 2" (1.88 m)   Wt 94.4 kg (208 lb 1.8 oz)   SpO2 93%   BMI 26.72 kg/m²   - better controlled  - recommend continuing amlodipine and increase to 10mg and uptitrate depending on hospital stay. Renal functions are stable and BG is also stable.  Will need close OP ambulatory BP management

## 2023-11-29 ENCOUNTER — TELEPHONE (OUTPATIENT)
Dept: NEPHROLOGY | Facility: CLINIC | Age: 53
End: 2023-11-29

## 2023-11-29 ENCOUNTER — NURSE TRIAGE (OUTPATIENT)
Age: 53
End: 2023-11-29

## 2023-11-29 ENCOUNTER — TELEPHONE (OUTPATIENT)
Dept: SURGERY | Facility: CLINIC | Age: 53
End: 2023-11-29

## 2023-11-29 NOTE — TELEPHONE ENCOUNTER
I called and i was unable to confirm appointment for tomorrow thrusday 11/30/2023 with Dr. Antonella Armstrong. Patients mail box is not set up as of yet.

## 2023-11-29 NOTE — TELEPHONE ENCOUNTER
Tried calling patient could not leave message because voicemail is not set up. Patient is coming in tomorrow for appointment to see Dr. Andreas Quiroz.

## 2023-11-29 NOTE — TELEPHONE ENCOUNTER
Answer Assessment - Initial Assessment Questions  1. REASON FOR CALL or QUESTION: "What is your reason for calling today?" or "How can I best help you?" or "What question do you have that I can help answer?"           Patient calling to confirm appt for tomorrow , after missed call from our office. Appt confirmed    Protocols used:  Information Only Call - No Triage-ADULT-[place]

## 2023-11-29 NOTE — TELEPHONE ENCOUNTER
Patient called leaving a voicemail stating he had IHR with you on 11/17/23. He still has a bulge that he believes is bigger than before the procedure. He stated not a lot of pain but there is discomfort when he stretches and he considers this limiting his mobility since he is unable move a certain way without feeling this twinge. I scheduled him an appointment with you for tomorrow and informed him if he was in further pain or discomfort to call us back and we can have him come in to see Dr. Shavon Grimm but that was only if it were emergent. He stated he was ok with waiting to see you tomorrow.

## 2023-11-30 ENCOUNTER — OFFICE VISIT (OUTPATIENT)
Dept: SURGERY | Facility: CLINIC | Age: 53
End: 2023-11-30

## 2023-11-30 VITALS
SYSTOLIC BLOOD PRESSURE: 122 MMHG | HEIGHT: 74 IN | BODY MASS INDEX: 26.39 KG/M2 | WEIGHT: 205.6 LBS | TEMPERATURE: 97.8 F | RESPIRATION RATE: 18 BRPM | DIASTOLIC BLOOD PRESSURE: 78 MMHG | HEART RATE: 74 BPM | OXYGEN SATURATION: 97 %

## 2023-11-30 DIAGNOSIS — Z48.89 POSTOPERATIVE VISIT: Primary | ICD-10-CM

## 2023-11-30 PROCEDURE — 99024 POSTOP FOLLOW-UP VISIT: CPT | Performed by: SURGERY

## 2023-11-30 NOTE — PROGRESS NOTES
Post-Op Follow Up- General Surgery   Norman Slater 48 y.o. male MRN: [id number]  Unit/Bed#:  Encounter: [id number]    Assessment/Plan     Assessment:  Status post laparoscopic transabdominal right inguinal hernia repair with mesh, improved  Plan:  Patient has a small seroma on the right groin, not uncommon after this type of operation and after having incarceration of the small bowel. He is allowed to go back to his usual physical activities without restrictions and he is discharged from my care. History of Present Illness     HPI:  Norman Slater is a 48 y.o. male who presents to my office for first postop follow-up after laparoscopic transabdominal right inguinal hernia repair with mesh from November 17. The patient started noticing a bulge in the right groin a couple days after surgery. He has some tightness on the area, he denies any pain, nausea, vomiting, chills, fever, diarrhea, constipation or urinary symptoms.     Historical Information   Past Medical History:   Diagnosis Date    Hypertension      Past Surgical History:   Procedure Laterality Date    HERNIA REPAIR      HERNIA REPAIR Right 11/17/2023    Procedure: REPAIR HERNIA INGUINAL, LAPAROSCOPIC with mesh;  Surgeon: Pascual Smith MD;  Location: [facility];  Service: General    WISDOM TOOTH EXTRACTION       Social History   Social History     Substance and Sexual Activity   Alcohol Use Never     Social History     Substance and Sexual Activity   Drug Use Never     Social History     Tobacco Use   Smoking Status Some Days    Packs/day: 0.25    Types: Cigarettes   Smokeless Tobacco Never     Family History: non-contributory    Meds/Allergies   all medications and allergies reviewed     Current Outpatient Medications:     amLODIPine (NORVASC) 10 mg tablet, Take 1 tablet (10 mg total) by mouth daily, Disp: 30 tablet, Rfl: 0  Allergies   Allergen Reactions    Penicillins Other (See Comments)     Pt states I've been allergic since childhood Objective     Current Vitals:   Blood Pressure: 122/78 (11/30/23 1455)  Pulse: 74 (11/30/23 1455)  Temperature: 97.8 °F (36.6 °C) (11/30/23 1455)  Respirations: 18 (11/30/23 1455)  Height: 6' 2" (188 cm) (11/30/23 1455)  Weight - Scale: 93.3 kg (205 lb 9.6 oz) (11/30/23 1455)  SpO2: 97 % (11/30/23 1455)    Physical Exam  Vitals and nursing note reviewed. Abdominal:      Comments: Abdomen is soft, nondistended and nontender. Incisions are well-healed without evidence of infection or incisional hernia. There is no recurrence of the hernia on the right groin. He does have seroma which patient was reassured this will resolve in the next couple of months.

## (undated) DEVICE — GAUZE SPONGES,8 PLY: Brand: CURITY

## (undated) DEVICE — PAD GROUNDING ADULT

## (undated) DEVICE — 3M™ TEGADERM™ TRANSPARENT FILM DRESSING FRAME STYLE, 1624W, 2-3/8 IN X 2-3/4 IN (6 CM X 7 CM), 100/CT 4CT/CASE: Brand: 3M™ TEGADERM™

## (undated) DEVICE — SCD SEQUENTIAL COMPRESSION COMFORT SLEEVE MEDIUM KNEE LENGTH: Brand: KENDALL SCD

## (undated) DEVICE — TROCAR: Brand: KII FIOS FIRST ENTRY

## (undated) DEVICE — COTTON TIP APPLICTOR 2 PK

## (undated) DEVICE — TUBING SMOKE EVAC W/FILTRATION DEVICE PLUMEPORT ACTIV

## (undated) DEVICE — SUT VICRYL 4-0 PS-2 27 IN J426H

## (undated) DEVICE — [HIGH FLOW INSUFFLATOR,  DO NOT USE IF PACKAGE IS DAMAGED,  KEEP DRY,  KEEP AWAY FROM SUNLIGHT,  PROTECT FROM HEAT AND RADIOACTIVE SOURCES.]: Brand: PNEUMOSURE

## (undated) DEVICE — SUT VICRYL 0 UR-6 27 IN J603H

## (undated) DEVICE — TOWEL SET X-RAY

## (undated) DEVICE — DRAPE EQUIPMENT RF WAND

## (undated) DEVICE — ELECTRODE LAP L WIRE E-Z CLEAN 33CM -0100

## (undated) DEVICE — LIGHT HANDLE COVER SLEEVE DISP BLUE STELLAR

## (undated) DEVICE — GLOVE INDICATOR PI UNDERGLOVE SZ 7.5 BLUE

## (undated) DEVICE — INSUFFLATION NEEDLE TO ESTABLISH PNEUMOPERITONEUM.: Brand: INSUFFLATION NEEDLE

## (undated) DEVICE — 3M™ STERI-STRIP™ REINFORCED ADHESIVE SKIN CLOSURES, R1546, 1/4 IN X 4 IN (6 MM X 100 MM), 10 STRIPS/ENVELOPE: Brand: 3M™ STERI-STRIP™

## (undated) DEVICE — CHLORAPREP HI-LITE 26ML ORANGE

## (undated) DEVICE — ALLENTOWN LAP CHOLE APP PACK: Brand: CARDINAL HEALTH

## (undated) DEVICE — NEPTUNE E-SEP SMOKE EVACUATION PENCIL, COATED, 70MM BLADE, PUSH BUTTON SWITCH: Brand: NEPTUNE E-SEP

## (undated) DEVICE — GLOVE SRG BIOGEL ECLIPSE 7.5

## (undated) DEVICE — SUT VLOC 180  0 8IN  VLOCA008L

## (undated) DEVICE — 3M™ STERI-STRIP™ REINFORCED ADHESIVE SKIN CLOSURES, R1542, 1/4 IN X 1-1/2 IN (6 MM X 38 MM), 6 STRIPS/ENVELOPE: Brand: 3M™ STERI-STRIP™

## (undated) DEVICE — SUTURING DEVICE: Brand: ENDO STITCH

## (undated) DEVICE — INTENDED FOR TISSUE SEPARATION, AND OTHER PROCEDURES THAT REQUIRE A SHARP SURGICAL BLADE TO PUNCTURE OR CUT.: Brand: BARD-PARKER SAFETY BLADES SIZE 15, STERILE

## (undated) DEVICE — 4-PORT MANIFOLD: Brand: NEPTUNE 2